# Patient Record
Sex: MALE | Race: WHITE | NOT HISPANIC OR LATINO | Employment: OTHER | URBAN - METROPOLITAN AREA
[De-identification: names, ages, dates, MRNs, and addresses within clinical notes are randomized per-mention and may not be internally consistent; named-entity substitution may affect disease eponyms.]

---

## 2017-01-26 ENCOUNTER — CLINICAL SUPPORT (OUTPATIENT)
Dept: CARDIOLOGY | Facility: CLINIC | Age: 67
End: 2017-01-26
Payer: COMMERCIAL

## 2017-01-26 ENCOUNTER — OFFICE VISIT (OUTPATIENT)
Dept: CARDIOLOGY | Facility: CLINIC | Age: 67
End: 2017-01-26
Payer: COMMERCIAL

## 2017-01-26 VITALS
BODY MASS INDEX: 27.52 KG/M2 | WEIGHT: 192.25 LBS | HEART RATE: 60 BPM | DIASTOLIC BLOOD PRESSURE: 70 MMHG | HEIGHT: 70 IN | SYSTOLIC BLOOD PRESSURE: 152 MMHG

## 2017-01-26 DIAGNOSIS — R93.1 ABNORMAL NUCLEAR CARDIAC IMAGING TEST: ICD-10-CM

## 2017-01-26 DIAGNOSIS — I25.10 CORONARY ARTERY DISEASE, ANGINA PRESENCE UNSPECIFIED, UNSPECIFIED VESSEL OR LESION TYPE, UNSPECIFIED WHETHER NATIVE OR TRANSPLANTED HEART: Primary | ICD-10-CM

## 2017-01-26 DIAGNOSIS — G47.30 SLEEP APNEA, UNSPECIFIED TYPE: ICD-10-CM

## 2017-01-26 DIAGNOSIS — Z82.49 FH: CAD (CORONARY ARTERY DISEASE): ICD-10-CM

## 2017-01-26 DIAGNOSIS — E78.5 HYPERLIPIDEMIA, UNSPECIFIED HYPERLIPIDEMIA TYPE: ICD-10-CM

## 2017-01-26 DIAGNOSIS — R94.30: ICD-10-CM

## 2017-01-26 DIAGNOSIS — I10 ESSENTIAL HYPERTENSION: ICD-10-CM

## 2017-01-26 DIAGNOSIS — I25.10 CORONARY ARTERY DISEASE, ANGINA PRESENCE UNSPECIFIED, UNSPECIFIED VESSEL OR LESION TYPE, UNSPECIFIED WHETHER NATIVE OR TRANSPLANTED HEART: ICD-10-CM

## 2017-01-26 PROCEDURE — 1157F ADVNC CARE PLAN IN RCRD: CPT | Mod: S$GLB,,, | Performed by: INTERNAL MEDICINE

## 2017-01-26 PROCEDURE — 99213 OFFICE O/P EST LOW 20 MIN: CPT | Mod: S$GLB,,, | Performed by: INTERNAL MEDICINE

## 2017-01-26 PROCEDURE — 1160F RVW MEDS BY RX/DR IN RCRD: CPT | Mod: S$GLB,,, | Performed by: INTERNAL MEDICINE

## 2017-01-26 PROCEDURE — 3078F DIAST BP <80 MM HG: CPT | Mod: S$GLB,,, | Performed by: INTERNAL MEDICINE

## 2017-01-26 PROCEDURE — 93000 ELECTROCARDIOGRAM COMPLETE: CPT | Mod: S$GLB,,, | Performed by: INTERNAL MEDICINE

## 2017-01-26 PROCEDURE — 3077F SYST BP >= 140 MM HG: CPT | Mod: S$GLB,,, | Performed by: INTERNAL MEDICINE

## 2017-01-26 PROCEDURE — 1159F MED LIST DOCD IN RCRD: CPT | Mod: S$GLB,,, | Performed by: INTERNAL MEDICINE

## 2017-01-26 PROCEDURE — 99999 PR PBB SHADOW E&M-EST. PATIENT-LVL III: CPT | Mod: PBBFAC,,, | Performed by: INTERNAL MEDICINE

## 2017-01-26 NOTE — PROGRESS NOTES
Subjective:   Patient ID:  Jose Luis Walker is a 66 y.o. male who presents for follow up of Coronary Artery Disease (annual f/u); Hypertension; and Hyperlipidemia      HPI  A 65 yo male with abnormal cardiolite non obs cad is  Here for f/u he lost weight compliant with diet uses cpap for sleep apnea  His blood pressure is well controlled today his bp is unusually  High.he ha sno chest pain or shortenss ofb breath very active physically ahs no syncope near syncope headache blurred vision chest pain or shortness of breath,.walks regularily for exercise.he is not taking any statins.   Past Medical History   Diagnosis Date    CAD (coronary artery disease) 6/26/2014     nonobs cad via cath    Ex-smoker     FH: CAD (coronary artery disease) 12/5/2013    Glaucoma     Hyperlipidemia 12/5/2013    Hypertension 12/5/2013    Lumbar disc disorder with myelopathy     Sleep apnea        Past Surgical History   Procedure Laterality Date    Tonsillectomy, adenoidectomy      Cardiac catheterization         Social History   Substance Use Topics    Smoking status: Former Smoker     Packs/day: 0.50     Years: 10.00     Quit date: 11/19/1983    Smokeless tobacco: Never Used      Comment: quit 30 yrs ago    Alcohol use Yes      Comment: Seldom       Family History   Problem Relation Age of Onset    Diabetes Father     Heart disease Father 58     MI     Heart attack Father     Hypertension Mother     Hyperlipidemia Mother     Heart disease Brother     Cancer Paternal Grandfather      Pancreatic Cancer    Glaucoma Paternal Uncle     Glaucoma Maternal Grandfather     Cataracts Paternal Grandmother     Strabismus Neg Hx     Retinal detachment Neg Hx     Macular degeneration Neg Hx     Blindness Neg Hx     Amblyopia Neg Hx     Thyroid disease Neg Hx     Stroke Neg Hx        Current Outpatient Prescriptions   Medication Sig    aspirin (ECOTRIN) 81 MG EC tablet Take 1 tablet (81 mg total) by mouth once daily.     GLUC/CHND/OM3/DHA/EPA/FISH/STR (GLUCOSAMINE CHONDROITIN PLUS ORAL) Take by mouth. Patient states that he takes this medication daily    latanoprost 0.005 % ophthalmic solution INSTILL 1 DROP IN BOTH EYES AT BEDTIME    metoprolol succinate (TOPROL-XL) 50 MG 24 hr tablet TAKE 1 TABLET (50 MG TOTAL) BY MOUTH ONCE DAILY.    ezetimibe (ZETIA) 10 mg tablet Take 1 tablet (10 mg total) by mouth every evening.    UNKNOWN TO PATIENT Medication for hair and nails     No current facility-administered medications for this visit.      Current Outpatient Prescriptions on File Prior to Visit   Medication Sig    aspirin (ECOTRIN) 81 MG EC tablet Take 1 tablet (81 mg total) by mouth once daily.    GLUC/CHND/OM3/DHA/EPA/FISH/STR (GLUCOSAMINE CHONDROITIN PLUS ORAL) Take by mouth. Patient states that he takes this medication daily    latanoprost 0.005 % ophthalmic solution INSTILL 1 DROP IN BOTH EYES AT BEDTIME    metoprolol succinate (TOPROL-XL) 50 MG 24 hr tablet TAKE 1 TABLET (50 MG TOTAL) BY MOUTH ONCE DAILY.    ezetimibe (ZETIA) 10 mg tablet Take 1 tablet (10 mg total) by mouth every evening.    UNKNOWN TO PATIENT Medication for hair and nails     No current facility-administered medications on file prior to visit.      Review of patient's allergies indicates:   Allergen Reactions    Codeine Other (See Comments)     Malaise and fatigue    Lipitor [atorvastatin]      aches    Pravastatin      Back aches      Simvastatin      Muscle aches       Review of Systems   Constitution: Negative for weakness and malaise/fatigue.   HENT: Negative for headaches.    Eyes: Negative for blurred vision.   Cardiovascular: Negative for chest pain, claudication, cyanosis, dyspnea on exertion, irregular heartbeat, leg swelling, near-syncope, orthopnea, palpitations and paroxysmal nocturnal dyspnea.   Respiratory: Negative for cough, hemoptysis and shortness of breath.    Hematologic/Lymphatic: Negative for bleeding problem. Does not  "bruise/bleed easily.   Skin: Negative for dry skin and itching.   Musculoskeletal: Negative for falls, muscle weakness and myalgias.   Gastrointestinal: Negative for abdominal pain, diarrhea, heartburn, hematemesis, hematochezia and melena.   Genitourinary: Negative for flank pain and hematuria.   Neurological: Negative for dizziness, focal weakness, light-headedness, numbness, paresthesias and seizures.   Psychiatric/Behavioral: Negative for altered mental status and memory loss. The patient is not nervous/anxious.    Allergic/Immunologic: Negative for hives.       Objective:   Physical Exam   Constitutional: He is oriented to person, place, and time. He appears well-developed and well-nourished. No distress.   HENT:   Head: Normocephalic and atraumatic.   Eyes: EOM are normal. Pupils are equal, round, and reactive to light. Right eye exhibits no discharge. Left eye exhibits no discharge.   Neck: Neck supple. No JVD present. No thyromegaly present.   Cardiovascular: Normal rate, regular rhythm, normal heart sounds and intact distal pulses.  Exam reveals no gallop and no friction rub.    No murmur heard.  Pulmonary/Chest: Effort normal and breath sounds normal. No respiratory distress. He has no wheezes. He has no rales. He exhibits no tenderness.   Abdominal: Soft. Bowel sounds are normal. He exhibits no distension. There is no tenderness.   Musculoskeletal: Normal range of motion. He exhibits no edema.   Neurological: He is alert and oriented to person, place, and time. No cranial nerve deficit.   Skin: Skin is warm and dry. No rash noted. He is not diaphoretic. No erythema.   Psychiatric: He has a normal mood and affect. His behavior is normal.   Nursing note and vitals reviewed.    Vitals:    01/26/17 1439   BP: (!) 152/70   Pulse: 60   Weight: 87.2 kg (192 lb 3.9 oz)   Height: 5' 10" (1.778 m)     Lab Results   Component Value Date    CHOL 216 (H) 07/14/2016    CHOL 201 (H) 01/05/2016    CHOL 205 (H) 12/19/2014 "     Lab Results   Component Value Date    HDL 56 07/14/2016    HDL 49 01/05/2016    HDL 51 12/19/2014     Lab Results   Component Value Date    LDLCALC 149.4 07/14/2016    LDLCALC 134.4 01/05/2016    LDLCALC 141.4 12/19/2014     Lab Results   Component Value Date    TRIG 53 07/14/2016    TRIG 88 01/05/2016    TRIG 63 12/19/2014     Lab Results   Component Value Date    CHOLHDL 25.9 07/14/2016    CHOLHDL 24.4 01/05/2016    CHOLHDL 24.9 12/19/2014       Chemistry        Component Value Date/Time     07/14/2016 0758    K 4.3 07/14/2016 0758     07/14/2016 0758    CO2 28 07/14/2016 0758    BUN 16 07/14/2016 0758    CREATININE 1.0 07/14/2016 0758     (H) 07/14/2016 0758        Component Value Date/Time    CALCIUM 9.4 07/14/2016 0758    ALKPHOS 62 07/14/2016 0758    AST 18 07/14/2016 0758    ALT 19 07/14/2016 0758    BILITOT 0.5 07/14/2016 0758          Lab Results   Component Value Date    TSH 1.085 11/19/2013     No results found for: INR, PROTIME  Lab Results   Component Value Date    WBC 6.09 03/16/2015    HGB 14.1 03/16/2015    HCT 41.5 03/16/2015    MCV 94 03/16/2015     03/16/2015     BMP  Sodium   Date Value Ref Range Status   07/14/2016 142 136 - 145 mmol/L Final     Potassium   Date Value Ref Range Status   07/14/2016 4.3 3.5 - 5.1 mmol/L Final     Chloride   Date Value Ref Range Status   07/14/2016 106 95 - 110 mmol/L Final     CO2   Date Value Ref Range Status   07/14/2016 28 23 - 29 mmol/L Final     BUN, Bld   Date Value Ref Range Status   07/14/2016 16 8 - 23 mg/dL Final     Creatinine   Date Value Ref Range Status   07/14/2016 1.0 0.5 - 1.4 mg/dL Final     Calcium   Date Value Ref Range Status   07/14/2016 9.4 8.7 - 10.5 mg/dL Final     Anion Gap   Date Value Ref Range Status   07/14/2016 8 8 - 16 mmol/L Final     eGFR if    Date Value Ref Range Status   07/14/2016 >60.0 >60 mL/min/1.73 m^2 Final     eGFR if non    Date Value Ref Range Status    07/14/2016 >60.0 >60 mL/min/1.73 m^2 Final     Comment:     Calculation used to obtain the estimated glomerular filtration  rate (eGFR) is the CKD-EPI equation. Since race is unknown   in our information system, the eGFR values for   -American and Non--American patients are given   for each creatinine result.       CrCl cannot be calculated (Patient has no serum creatinine result on file.).    Assessment:     1. Coronary artery disease, angina presence unspecified, unspecified vessel or lesion type, unspecified whether native or transplanted heart    2. Nonspecific abnormal unspecified cardiovascular function study    3. Abnormal nuclear cardiac imaging test    4. Essential hypertension    5. Hyperlipidemia, unspecified hyperlipidemia type    6. FH: CAD (coronary artery disease)    7. Sleep apnea, unspecified type      Asymptomatic clinically counseled about compliance.  Plan:   Lipid cmp   Risk factor modification   Diet exercise   Continue weight loss  Low salt   check on need for statins again.

## 2017-01-26 NOTE — MR AVS SNAPSHOT
[facility]  [address] [facility] [address]  Phone: [phone]  Fax: [phone]                  Jose Luis Walker   2017 2:15 PM   Office Visit    Description:  Male : 1950   Provider:  Patrick Shin MD   Department:  [facility]           Reason for Visit     Coronary Artery Disease     Hypertension     Hyperlipidemia           Diagnoses this Visit        Comments    Coronary artery disease, angina presence unspecified, unspecified vessel or lesion type, unspecified whether native or transplanted heart    -  Primary     Nonspecific abnormal unspecified cardiovascular function study         Abnormal nuclear cardiac imaging test         Essential hypertension         Hyperlipidemia, unspecified hyperlipidemia type         FH: CAD (coronary artery disease)         Sleep apnea, unspecified type                To Do List           Future Appointments        Provider Department Dept Phone    2017 7:45 AM Joseph Garcia MD [facility] Ophthalmology [phone]      Goals (5 Years of Data)     None      Follow-Up and Disposition     Return in about 6 months (around 2017).      [facility]     [facility] Nurse Care Line -  Assistance  Registered nurses in the [facility] Center provide clinical advisement, health education, appointment booking, and other advisory services.  Call for this free service at [phone].             Medications           Message regarding Medications     Verify the changes and/or additions to your medication regime listed below are the same as discussed with your clinician today.  If any of these changes or additions are incorrect, please notify your healthcare provider.             Verify that the below list of medications is an accurate representation of the medications you are currently taking.  If none reported, the list may be blank. If incorrect, please contact your healthcare provider. Carry this  "list with you in case of emergency.           Current Medications     aspirin (ECOTRIN) 81 MG EC tablet Take 1 tablet (81 mg total) by mouth once daily.    GLUC/CHND/OM3/DHA/EPA/FISH/STR (GLUCOSAMINE CHONDROITIN PLUS ORAL) Take by mouth. Patient states that he takes this medication daily    latanoprost 0.005 % ophthalmic solution INSTILL 1 DROP IN BOTH EYES AT BEDTIME    metoprolol succinate (TOPROL-XL) 50 MG 24 hr tablet TAKE 1 TABLET (50 MG TOTAL) BY MOUTH ONCE DAILY.    ezetimibe (ZETIA) 10 mg tablet Take 1 tablet (10 mg total) by mouth every evening.    UNKNOWN TO PATIENT Medication for hair and nails           Clinical Reference Information           Vital Signs - Last Recorded  Most recent update: 1/26/2017  2:47 PM by Dianne Chaudhary LPN    BP Pulse Ht Wt BMI    (!) 152/70 60 5' 10" (1.778 m) 87.2 kg (192 lb 3.9 oz) 27.58 kg/m2      Blood Pressure          Most Recent Value    Right Arm BP - Sitting  152/70    Left Arm BP - Sitting  152/66    BP  (!)  152/70      Allergies as of 1/26/2017     Codeine    Lipitor [Atorvastatin]    Pravastatin    Simvastatin      Immunizations Administered on Date of Encounter - 1/26/2017     None      Orders Placed During Today's Visit     Future Labs/Procedures Expected by Expires    Comprehensive metabolic panel  1/26/2017 (Approximate) 1/26/2018    Lipid panel  1/26/2017 (Approximate) 1/26/2018    SCHEDULED EKG 12-LEAD (to Muse)  As directed 1/25/2018      Smoking Cessation     If you would like to quit smoking:   You may be eligible for free services if you are a [place] resident and started smoking cigarettes before September 1, 1988.  Call the [employer]oking [employer]essation [employer] (S[employer]) toll free at (878) 979-7460 or (942) 621-0917.   Call 0-800-QUIT-NOW if you do not meet the above criteria.            "

## 2017-01-27 ENCOUNTER — OFFICE VISIT (OUTPATIENT)
Dept: OPHTHALMOLOGY | Facility: CLINIC | Age: 67
End: 2017-01-27
Payer: COMMERCIAL

## 2017-01-27 DIAGNOSIS — H25.13 NUCLEAR SCLEROSIS, BILATERAL: ICD-10-CM

## 2017-01-27 DIAGNOSIS — H40.1131 PRIMARY OPEN ANGLE GLAUCOMA OF BOTH EYES, MILD STAGE: Primary | ICD-10-CM

## 2017-01-27 PROCEDURE — 92012 INTRM OPH EXAM EST PATIENT: CPT | Mod: S$GLB,,, | Performed by: OPHTHALMOLOGY

## 2017-01-27 PROCEDURE — 99999 PR PBB SHADOW E&M-EST. PATIENT-LVL II: CPT | Mod: PBBFAC,,, | Performed by: OPHTHALMOLOGY

## 2017-01-27 NOTE — PROGRESS NOTES
SUBJECTIVE:   Jose uLis Walker is a 66 y.o. male   Uncorrected distance visual acuity was 20/25 -2 in the right eye and 20/25 -2 in the left eye.   Chief Complaint   Patient presents with    Glaucoma        HPI:  HPI     Pt states that there has been no change since last appt. 100% compliant   with gtts.     1. Mild COAG pre SLT 27/28 (goal = 21)  (? Steroid responder)  SLT OD 11/09 (27-17)  SLT OS 9/09 (28-18)  2. Trace NSC  3. ?DM (patient doubts diagnosis)    Latanoprost QHS OU       Last edited by Aniket Baer, Patient Care Assistant on 1/27/2017  7:45   AM.     Assessment /Plan :  1. Primary open angle glaucoma of both eyes, mild stage   Doing well, IOP OD within acceptable range relative to target IOP and no evidence of progression. Continue current treatment. Reviewed importance of continued compliance with treatment and follow up.    IOP OS not within acceptable range relative to target IOP with risk of irreversible visual loss. Better IOP control is recommended. Discussed options, risks, and benefits of additional medication, SLT laser, and/or incisional glaucoma surgery. Reviewed importance of continued compliance with treatment and follow up.     Patient chooses defer and recheck IOP next visit  Steroid responder     2. Nuclear sclerosis, bilateral monitor for now       Return to clinic in 3 months  or as needed.  With 24-2 HVF, Dilation and SDP's

## 2017-02-01 ENCOUNTER — LAB VISIT (OUTPATIENT)
Dept: LAB | Facility: HOSPITAL | Age: 67
End: 2017-02-01
Attending: INTERNAL MEDICINE
Payer: COMMERCIAL

## 2017-02-01 DIAGNOSIS — I25.10 CORONARY ARTERY DISEASE, ANGINA PRESENCE UNSPECIFIED, UNSPECIFIED VESSEL OR LESION TYPE, UNSPECIFIED WHETHER NATIVE OR TRANSPLANTED HEART: ICD-10-CM

## 2017-02-01 LAB
ALBUMIN SERPL BCP-MCNC: 3.5 G/DL
ALP SERPL-CCNC: 48 U/L
ALT SERPL W/O P-5'-P-CCNC: 16 U/L
ANION GAP SERPL CALC-SCNC: 7 MMOL/L
AST SERPL-CCNC: 18 U/L
BILIRUB SERPL-MCNC: 0.3 MG/DL
BUN SERPL-MCNC: 14 MG/DL
CALCIUM SERPL-MCNC: 9 MG/DL
CHLORIDE SERPL-SCNC: 109 MMOL/L
CHOLEST/HDLC SERPL: 3.5 {RATIO}
CO2 SERPL-SCNC: 27 MMOL/L
CREAT SERPL-MCNC: 0.9 MG/DL
EST. GFR  (AFRICAN AMERICAN): >60 ML/MIN/1.73 M^2
EST. GFR  (NON AFRICAN AMERICAN): >60 ML/MIN/1.73 M^2
GLUCOSE SERPL-MCNC: 110 MG/DL
HDL/CHOLESTEROL RATIO: 28.6 %
HDLC SERPL-MCNC: 196 MG/DL
HDLC SERPL-MCNC: 56 MG/DL
LDLC SERPL CALC-MCNC: 129.2 MG/DL
NONHDLC SERPL-MCNC: 140 MG/DL
POTASSIUM SERPL-SCNC: 4.3 MMOL/L
PROT SERPL-MCNC: 6.8 G/DL
SODIUM SERPL-SCNC: 143 MMOL/L
TRIGL SERPL-MCNC: 54 MG/DL

## 2017-02-01 PROCEDURE — 36415 COLL VENOUS BLD VENIPUNCTURE: CPT

## 2017-02-01 PROCEDURE — 80053 COMPREHEN METABOLIC PANEL: CPT

## 2017-02-01 PROCEDURE — 80061 LIPID PANEL: CPT

## 2017-02-02 ENCOUNTER — TELEPHONE (OUTPATIENT)
Dept: CARDIOLOGY | Facility: CLINIC | Age: 67
End: 2017-02-02

## 2017-02-02 NOTE — TELEPHONE ENCOUNTER
----- Message from Patrick Shin MD sent at 2/1/2017 11:22 PM CST -----  His lipids are not well controlled although improved from before will suggest crestor 10 mg po daily

## 2017-02-02 NOTE — TELEPHONE ENCOUNTER
Attempted without success to reach patient regarding lab results.  Message left on voicemail of mobile phone.  No answer on home phone.

## 2017-03-01 DIAGNOSIS — E78.5 OTHER AND UNSPECIFIED HYPERLIPIDEMIA: Primary | ICD-10-CM

## 2017-03-01 RX ORDER — ROSUVASTATIN CALCIUM 10 MG/1
10 TABLET, COATED ORAL NIGHTLY
Qty: 90 TABLET | Refills: 3 | Status: SHIPPED | OUTPATIENT
Start: 2017-03-01 | End: 2018-02-24 | Stop reason: SDUPTHER

## 2017-03-01 NOTE — TELEPHONE ENCOUNTER
The patient has been notified of this information and all questions answered. Crestor 10mg qhs routed to Dr. Shin for approval.

## 2017-03-09 ENCOUNTER — PATIENT MESSAGE (OUTPATIENT)
Dept: RESEARCH | Facility: HOSPITAL | Age: 67
End: 2017-03-09

## 2017-03-28 RX ORDER — METOPROLOL SUCCINATE 50 MG/1
TABLET, EXTENDED RELEASE ORAL
Qty: 90 TABLET | Refills: 1 | Status: SHIPPED | OUTPATIENT
Start: 2017-03-28 | End: 2017-09-26 | Stop reason: SDUPTHER

## 2017-03-28 RX ORDER — LATANOPROST 50 UG/ML
SOLUTION/ DROPS OPHTHALMIC
Qty: 2.5 ML | Refills: 12 | Status: SHIPPED | OUTPATIENT
Start: 2017-03-28 | End: 2018-03-29 | Stop reason: SDUPTHER

## 2017-04-07 ENCOUNTER — OFFICE VISIT (OUTPATIENT)
Dept: OPHTHALMOLOGY | Facility: CLINIC | Age: 67
End: 2017-04-07
Payer: COMMERCIAL

## 2017-04-07 DIAGNOSIS — H40.1131 PRIMARY OPEN ANGLE GLAUCOMA OF BOTH EYES, MILD STAGE: Primary | ICD-10-CM

## 2017-04-07 DIAGNOSIS — H25.13 NUCLEAR SCLEROSIS, BILATERAL: ICD-10-CM

## 2017-04-07 PROCEDURE — 92083 EXTENDED VISUAL FIELD XM: CPT | Mod: S$GLB,,, | Performed by: OPHTHALMOLOGY

## 2017-04-07 PROCEDURE — 99999 PR PBB SHADOW E&M-EST. PATIENT-LVL II: CPT | Mod: PBBFAC,,, | Performed by: OPHTHALMOLOGY

## 2017-04-07 PROCEDURE — 92014 COMPRE OPH EXAM EST PT 1/>: CPT | Mod: S$GLB,,, | Performed by: OPHTHALMOLOGY

## 2017-04-07 PROCEDURE — 92250 FUNDUS PHOTOGRAPHY W/I&R: CPT | Mod: S$GLB,,, | Performed by: OPHTHALMOLOGY

## 2017-04-07 NOTE — PROGRESS NOTES
SUBJECTIVE:   Jose Luis Walker is a 66 y.o. male   Uncorrected distance visual acuity was 20/30 in the right eye and 20/30 in the left eye.   Chief Complaint   Patient presents with    Glaucoma     HVF SDP chk        HPI:  HPI     Glaucoma    Additional comments: HVF SDP chk           Comments   Pt states no change since last exam. No pain or discomfort. 1005 compliant   with gtts.     1. Mild COAG pre SLT 27/28 (goal = 21)  ( Steroid responder)  SLT OD 11/09 (27-17)  SLT OS 9/09 (28-18)  2. Trace NSC  3. Fhx DM    Latanoprost QHS OU       Last edited by Aniket Baer, Patient Care Assistant on 4/7/2017  7:58   AM. (History)        Assessment /Plan :  1. Primary open angle glaucoma of both eyes, mild stage Doing well, IOP within acceptable range relative to target IOP and no evidence of progression. Continue current treatment. Reviewed importance of continued compliance with treatment and follow up.   2. Nuclear sclerosis, bilateral  - not visually significant. Observe.     Return to clinic in 4 months  or as needed.  With IOP Check and GOCT

## 2017-04-07 NOTE — MR AVS SNAPSHOT
[facility] - Ophthalmology  [address] [facility] [place] [address]  Phone: [phone]  Fax: [phone]                  Jose Luis Walker   2017 8:00 AM   Office Visit    Description:  Male : 1950   Provider:  Joseph Garcia MD   Department:  [facility] - Ophthalmology           Reason for Visit     Glaucoma           Diagnoses this Visit        Comments    Primary open angle glaucoma of both eyes, mild stage    -  Primary     Nuclear sclerosis, bilateral                To Do List           Goals (5 Years of Data)     None      Follow-Up and Disposition     Return in about 4 months (around 2017).      [facility]     [facility] On [facility] Nurse Care Line -  Assistance  Unless otherwise directed by your provider, please contact [facility], our nurse care line that is available for  assistance.     Registered nurses in the [facility] Center provide: appointment scheduling, clinical advisement, health education, and other advisory services.  Call: [phone] (toll free)               Medications           Message regarding Medications     Verify the changes and/or additions to your medication regime listed below are the same as discussed with your clinician today.  If any of these changes or additions are incorrect, please notify your healthcare provider.             Verify that the below list of medications is an accurate representation of the medications you are currently taking.  If none reported, the list may be blank. If incorrect, please contact your healthcare provider. Carry this list with you in case of emergency.           Current Medications     aspirin (ECOTRIN) 81 MG EC tablet Take 1 tablet (81 mg total) by mouth once daily.    ezetimibe (ZETIA) 10 mg tablet Take 1 tablet (10 mg total) by mouth every evening.    GLUC/CHND/OM3/DHA/EPA/FISH/STR (GLUCOSAMINE CHONDROITIN PLUS ORAL) Take by mouth. Patient states that he takes this medication daily    latanoprost 0.005 %  ophthalmic solution INSTILL 1 DROP INTO BOTH EYES AT BEDTIME    metoprolol succinate (TOPROL-XL) 50 MG 24 hr tablet TAKE 1 TABLET (50 MG TOTAL) BY MOUTH ONCE DAILY.    rosuvastatin (CRESTOR) 10 MG tablet Take 1 tablet (10 mg total) by mouth every evening.    UNKNOWN TO PATIENT Medication for hair and nails           Clinical Reference Information           Allergies as of 4/7/2017     Codeine    Lipitor [Atorvastatin]    Pravastatin    Simvastatin      Immunizations Administered on Date of Encounter - 4/7/2017     None      Orders Placed During Today's Visit      Normal Orders This Visit    Color Fundus Photography - OU - Both Eyes     Mata Visual Field - OU - Extended - Both Eyes       Language Assistance Services     ATTENTION: Language assistance services are available, free of charge. Please call [phone].      ATENCIÓN: Si tylerla petra, tiene a vásquez disposición servicios gratuitos de asistencia lingüística. Llame al [phone].     CHÚ Ý: N?u b?n nói Ti?ng Vi?t, có các d?ch v? h? tr? ngôn ng? mi?n phí dành cho b?n. G?i s? [phone].         [facility] complies with applicable Federal civil rights laws and does not discriminate on the basis of race, color, national origin, age, disability, or sex.

## 2017-08-25 ENCOUNTER — OFFICE VISIT (OUTPATIENT)
Dept: OPHTHALMOLOGY | Facility: CLINIC | Age: 67
End: 2017-08-25
Payer: COMMERCIAL

## 2017-08-25 DIAGNOSIS — H40.1131 PRIMARY OPEN ANGLE GLAUCOMA OF BOTH EYES, MILD STAGE: Primary | ICD-10-CM

## 2017-08-25 DIAGNOSIS — H25.13 NUCLEAR SCLEROSIS OF BOTH EYES: ICD-10-CM

## 2017-08-25 PROCEDURE — 92012 INTRM OPH EXAM EST PATIENT: CPT | Mod: S$GLB,,, | Performed by: OPHTHALMOLOGY

## 2017-08-25 PROCEDURE — 99999 PR PBB SHADOW E&M-EST. PATIENT-LVL II: CPT | Mod: PBBFAC,,, | Performed by: OPHTHALMOLOGY

## 2017-08-25 PROCEDURE — 92133 CPTRZD OPH DX IMG PST SGM ON: CPT | Mod: S$GLB,,, | Performed by: OPHTHALMOLOGY

## 2017-08-25 RX ORDER — KETOROLAC TROMETHAMINE 5 MG/ML
SOLUTION OPHTHALMIC
Qty: 1 BOTTLE | Refills: 0 | Status: SHIPPED | OUTPATIENT
Start: 2017-08-25 | End: 2017-10-06 | Stop reason: ALTCHOICE

## 2017-08-25 NOTE — PROGRESS NOTES
SUBJECTIVE:   Jose Luis Walker is a 67 y.o. male   Uncorrected distance visual acuity was 20/40 -2 in the right eye and 20/30 -2 in the left eye.   Chief Complaint   Patient presents with    Glaucoma     4 mth iop ck and goct.         HPI:  HPI     Glaucoma    Additional comments: 4 mth iop ck and goct.            Comments   1. Mild COAG pre SLT 27/28 (goal = 21)  ( Steroid responder)  SLT OD 11/09 (27-17)  SLT OS 9/09 (28-18)  2. Trace NSC  3. Fhx DM    Latanoprost QHS OU       Last edited by Arabella Wilkerson MA on 8/25/2017  8:21 AM. (History)        Assessment /Plan :  1. Primary open angle glaucoma of both eyes, mild stage IOP not within acceptable range relative to target IOP with risk of irreversible visual loss. Better IOP control is recommended. Discussed options, risks, and benefits of additional medication, SLT laser, and/or incisional glaucoma surgery. Reviewed importance of continued compliance with treatment and follow up.     Patient chooses schedule SLT  OU     2. Nuclear sclerosis of both eyes monitor for now     Return for the SLT OU

## 2017-09-13 ENCOUNTER — PATIENT OUTREACH (OUTPATIENT)
Dept: ADMINISTRATIVE | Facility: HOSPITAL | Age: 67
End: 2017-09-13

## 2017-09-13 NOTE — PROGRESS NOTES
I have attempted without success to contact this patient to schedule an appointment for blood pressure recheck. Patient also due for Abdominal Aortic Aneurysm Screening, PSA, influenza, and pneumococcal vaccinations.   Patient not available, left voicemail.

## 2017-09-28 RX ORDER — METOPROLOL SUCCINATE 50 MG/1
TABLET, EXTENDED RELEASE ORAL
Qty: 90 TABLET | Refills: 1 | Status: SHIPPED | OUTPATIENT
Start: 2017-09-28 | End: 2018-03-24 | Stop reason: SDUPTHER

## 2017-10-04 ENCOUNTER — OFFICE VISIT (OUTPATIENT)
Dept: CARDIOLOGY | Facility: CLINIC | Age: 67
End: 2017-10-04
Payer: COMMERCIAL

## 2017-10-04 VITALS
HEART RATE: 64 BPM | SYSTOLIC BLOOD PRESSURE: 130 MMHG | DIASTOLIC BLOOD PRESSURE: 66 MMHG | WEIGHT: 195.75 LBS | HEIGHT: 70 IN | BODY MASS INDEX: 28.02 KG/M2

## 2017-10-04 DIAGNOSIS — Z82.49 FH: CAD (CORONARY ARTERY DISEASE): ICD-10-CM

## 2017-10-04 DIAGNOSIS — G47.30 SLEEP APNEA, UNSPECIFIED TYPE: ICD-10-CM

## 2017-10-04 DIAGNOSIS — I10 ESSENTIAL HYPERTENSION: ICD-10-CM

## 2017-10-04 DIAGNOSIS — R73.03 PREDIABETES: ICD-10-CM

## 2017-10-04 DIAGNOSIS — E78.5 HYPERLIPIDEMIA, UNSPECIFIED HYPERLIPIDEMIA TYPE: ICD-10-CM

## 2017-10-04 DIAGNOSIS — I25.10 CORONARY ARTERY DISEASE INVOLVING NATIVE CORONARY ARTERY OF NATIVE HEART WITHOUT ANGINA PECTORIS: Primary | ICD-10-CM

## 2017-10-04 DIAGNOSIS — R94.39 ABNORMAL STRESS TEST: ICD-10-CM

## 2017-10-04 PROCEDURE — 99213 OFFICE O/P EST LOW 20 MIN: CPT | Mod: S$GLB,,, | Performed by: INTERNAL MEDICINE

## 2017-10-04 PROCEDURE — 99999 PR PBB SHADOW E&M-EST. PATIENT-LVL III: CPT | Mod: PBBFAC,,, | Performed by: INTERNAL MEDICINE

## 2017-10-05 DIAGNOSIS — Z00.00 ROUTINE GENERAL MEDICAL EXAMINATION AT A HEALTH CARE FACILITY: Primary | ICD-10-CM

## 2017-10-06 ENCOUNTER — CLINICAL SUPPORT (OUTPATIENT)
Dept: INTERNAL MEDICINE | Facility: CLINIC | Age: 67
End: 2017-10-06
Payer: COMMERCIAL

## 2017-10-06 ENCOUNTER — OFFICE VISIT (OUTPATIENT)
Dept: INTERNAL MEDICINE | Facility: CLINIC | Age: 67
End: 2017-10-06
Payer: COMMERCIAL

## 2017-10-06 ENCOUNTER — HOSPITAL ENCOUNTER (OUTPATIENT)
Dept: RADIOLOGY | Facility: HOSPITAL | Age: 67
Discharge: HOME | End: 2017-10-06
Attending: FAMILY MEDICINE
Payer: COMMERCIAL

## 2017-10-06 VITALS
BODY MASS INDEX: 27.75 KG/M2 | WEIGHT: 193.81 LBS | HEART RATE: 72 BPM | WEIGHT: 193.13 LBS | HEART RATE: 72 BPM | DIASTOLIC BLOOD PRESSURE: 64 MMHG | BODY MASS INDEX: 27.65 KG/M2 | SYSTOLIC BLOOD PRESSURE: 132 MMHG | RESPIRATION RATE: 14 BRPM | SYSTOLIC BLOOD PRESSURE: 132 MMHG | DIASTOLIC BLOOD PRESSURE: 64 MMHG | HEIGHT: 70 IN | TEMPERATURE: 96 F | HEIGHT: 70 IN

## 2017-10-06 DIAGNOSIS — Z82.49 FH: CAD (CORONARY ARTERY DISEASE): ICD-10-CM

## 2017-10-06 DIAGNOSIS — Z00.00 ROUTINE GENERAL MEDICAL EXAMINATION AT A HEALTH CARE FACILITY: Primary | ICD-10-CM

## 2017-10-06 DIAGNOSIS — Z00.00 ANNUAL PHYSICAL EXAM: Primary | ICD-10-CM

## 2017-10-06 DIAGNOSIS — Z00.00 ROUTINE GENERAL MEDICAL EXAMINATION AT A HEALTH CARE FACILITY: ICD-10-CM

## 2017-10-06 DIAGNOSIS — E78.5 HYPERLIPIDEMIA, UNSPECIFIED HYPERLIPIDEMIA TYPE: ICD-10-CM

## 2017-10-06 LAB
ALBUMIN SERPL BCP-MCNC: 3.7 G/DL
ALP SERPL-CCNC: 52 U/L
ALT SERPL W/O P-5'-P-CCNC: 22 U/L
ANION GAP SERPL CALC-SCNC: 10 MMOL/L
AST SERPL-CCNC: 21 U/L
BILIRUB SERPL-MCNC: 0.5 MG/DL
BUN SERPL-MCNC: 19 MG/DL
CALCIUM SERPL-MCNC: 9.9 MG/DL
CHLORIDE SERPL-SCNC: 107 MMOL/L
CHOLEST SERPL-MCNC: 135 MG/DL
CHOLEST/HDLC SERPL: 2.5 {RATIO}
CO2 SERPL-SCNC: 24 MMOL/L
COMPLEXED PSA SERPL-MCNC: 1.4 NG/ML
CREAT SERPL-MCNC: 0.9 MG/DL
ERYTHROCYTE [DISTWIDTH] IN BLOOD BY AUTOMATED COUNT: 13.5 %
EST. GFR  (AFRICAN AMERICAN): >60 ML/MIN/1.73 M^2
EST. GFR  (NON AFRICAN AMERICAN): >60 ML/MIN/1.73 M^2
ESTIMATED AVG GLUCOSE: 114 MG/DL
GLUCOSE SERPL-MCNC: 111 MG/DL
HBA1C MFR BLD HPLC: 5.6 %
HCT VFR BLD AUTO: 41.8 %
HDLC SERPL-MCNC: 55 MG/DL
HDLC SERPL: 40.7 %
HGB BLD-MCNC: 14.4 G/DL
LDLC SERPL CALC-MCNC: 67 MG/DL
MCH RBC QN AUTO: 31.4 PG
MCHC RBC AUTO-ENTMCNC: 34.4 G/DL
MCV RBC AUTO: 91 FL
NONHDLC SERPL-MCNC: 80 MG/DL
PLATELET # BLD AUTO: 203 K/UL
PMV BLD AUTO: 10.1 FL
POTASSIUM SERPL-SCNC: 3.7 MMOL/L
PROT SERPL-MCNC: 7.3 G/DL
RBC # BLD AUTO: 4.59 M/UL
SODIUM SERPL-SCNC: 141 MMOL/L
TRIGL SERPL-MCNC: 65 MG/DL
TSH SERPL DL<=0.005 MIU/L-ACNC: 1.17 UIU/ML
WBC # BLD AUTO: 5.87 K/UL

## 2017-10-06 PROCEDURE — 90471 IMMUNIZATION ADMIN: CPT | Mod: ,,, | Performed by: FAMILY MEDICINE

## 2017-10-06 PROCEDURE — 80061 LIPID PANEL: CPT | Mod: PO

## 2017-10-06 PROCEDURE — 99397 PER PM REEVAL EST PAT 65+ YR: CPT | Mod: 25,,, | Performed by: FAMILY MEDICINE

## 2017-10-06 PROCEDURE — 83036 HEMOGLOBIN GLYCOSYLATED A1C: CPT

## 2017-10-06 PROCEDURE — 84443 ASSAY THYROID STIM HORMONE: CPT

## 2017-10-06 PROCEDURE — 75571 CT HRT W/O DYE W/CA TEST: CPT | Mod: 26,,, | Performed by: RADIOLOGY

## 2017-10-06 PROCEDURE — 99999 PR PBB SHADOW E&M-EST. PATIENT-LVL III: CPT | Mod: PBBFAC,,, | Performed by: FAMILY MEDICINE

## 2017-10-06 PROCEDURE — 90670 PCV13 VACCINE IM: CPT | Mod: ,,, | Performed by: FAMILY MEDICINE

## 2017-10-06 PROCEDURE — 75571 CT HRT W/O DYE W/CA TEST: CPT | Mod: TC,PO

## 2017-10-06 PROCEDURE — 80053 COMPREHEN METABOLIC PANEL: CPT | Mod: PO

## 2017-10-06 PROCEDURE — 85027 COMPLETE CBC AUTOMATED: CPT | Mod: PO

## 2017-10-06 PROCEDURE — 84153 ASSAY OF PSA TOTAL: CPT

## 2017-10-06 NOTE — PROGRESS NOTES
Subjective:       Patient ID: Jose Luis Walker is a 67 y.o. male.    Chief Complaint: Annual Exam    Annual exam:      Pt is a 67 year old who is here for executive health. Pt has had multiple cardiac tests that were abnormal but Cath was negative. Pt is up-to-date on his wellness.       Review of Systems   Constitutional: Negative.    Respiratory: Negative.    Cardiovascular: Negative.    Genitourinary: Negative.    Musculoskeletal: Negative.    Neurological: Negative.    Hematological: Negative.    Psychiatric/Behavioral: Negative.        Objective:      Physical Exam   Constitutional: He is oriented to person, place, and time. He appears well-developed and well-nourished.   Neck: Normal range of motion. Neck supple.   Cardiovascular: Normal rate and regular rhythm.    No murmur heard.  Pulmonary/Chest: Effort normal and breath sounds normal. He has no wheezes. He has no rales.   Abdominal: Soft. Bowel sounds are normal. He exhibits no mass. There is no guarding.   Neurological: He is alert and oriented to person, place, and time.   Skin: Skin is warm and dry.   Psychiatric: He has a normal mood and affect. His behavior is normal.       Assessment:       1. Annual physical exam    2. Hyperlipidemia, unspecified hyperlipidemia type    3. FH: CAD (coronary artery disease)        Plan:       Annual physical exam  Comments:  Pt is over all good health  Orders:  -     (In Office Administered) Pneumococcal Conjugate Vaccine (13 Valent) (IM)    Hyperlipidemia, unspecified hyperlipidemia type  Comments:  Pt continues with crestor.     FH: CAD (coronary artery disease)  Comments:  Pt has had negative Angio

## 2017-11-03 ENCOUNTER — OFFICE VISIT (OUTPATIENT)
Dept: OPHTHALMOLOGY | Facility: CLINIC | Age: 67
End: 2017-11-03
Payer: COMMERCIAL

## 2017-11-03 DIAGNOSIS — H25.13 NUCLEAR SCLEROSIS OF BOTH EYES: ICD-10-CM

## 2017-11-03 DIAGNOSIS — H40.1131 PRIMARY OPEN ANGLE GLAUCOMA OF BOTH EYES, MILD STAGE: Primary | ICD-10-CM

## 2017-11-03 PROCEDURE — 92012 INTRM OPH EXAM EST PATIENT: CPT | Mod: S$GLB,,, | Performed by: OPHTHALMOLOGY

## 2017-11-03 PROCEDURE — 99999 PR PBB SHADOW E&M-EST. PATIENT-LVL II: CPT | Mod: PBBFAC,,, | Performed by: OPHTHALMOLOGY

## 2017-11-03 NOTE — PROGRESS NOTES
SUBJECTIVE:   Jose Luis Walker is a 67 y.o. male   Uncorrected distance visual acuity was 20/25 -1 in the right eye and 20/25 -2 in the left eye.   Chief Complaint   Patient presents with    Glaucoma     Latanoprost qhs % cx        HPI:  HPI     Glaucoma    Additional comments: Latanoprost qhs % cx           Comments   5 to 6 weeks post SLT OU 9/27/17    No new eye changes since patient's last visit    1. Mild COAG pre SLT 27/28 (goal = 21)  ( Steroid responder)  SLT OD 11/09 (27-17) + 9/27/17 (29-)  SLT OS 9/09 (28-18) + 9/27/17 (30-)  2. Trace NSC  3. Fhx DM    Latanoprost QHS OU  100% cx       Last edited by Alison Mariscal on 11/3/2017  8:08 AM. (History)        Assessment /Plan :  1. Primary open angle glaucoma of both eyes, mild stage   nice response to the SLT OU  Doing well, IOP within acceptable range relative to target IOP and no evidence of progression. Continue current treatment. Reviewed importance of continued compliance with treatment and follow up.     2. Nuclear sclerosis of both eyes  - not visually significant. Observe.         Return to clinic in 4 months  or as needed.  With IOP Check

## 2018-01-08 PROBLEM — Z00.00 ANNUAL PHYSICAL EXAM: Status: RESOLVED | Noted: 2017-10-06 | Resolved: 2018-01-08

## 2018-02-24 RX ORDER — ROSUVASTATIN CALCIUM 10 MG/1
10 TABLET, COATED ORAL NIGHTLY
Qty: 90 TABLET | Refills: 3 | Status: SHIPPED | OUTPATIENT
Start: 2018-02-24 | End: 2019-03-19 | Stop reason: SDUPTHER

## 2018-03-02 ENCOUNTER — OFFICE VISIT (OUTPATIENT)
Dept: OPHTHALMOLOGY | Facility: CLINIC | Age: 68
End: 2018-03-02
Payer: COMMERCIAL

## 2018-03-02 DIAGNOSIS — H25.13 NUCLEAR SCLEROSIS OF BOTH EYES: ICD-10-CM

## 2018-03-02 DIAGNOSIS — H40.1131 PRIMARY OPEN ANGLE GLAUCOMA OF BOTH EYES, MILD STAGE: Primary | ICD-10-CM

## 2018-03-02 PROCEDURE — 92012 INTRM OPH EXAM EST PATIENT: CPT | Mod: S$PBB,,, | Performed by: OPHTHALMOLOGY

## 2018-03-02 PROCEDURE — 99999 PR PBB SHADOW E&M-EST. PATIENT-LVL II: CPT | Mod: PBBFAC,,, | Performed by: OPHTHALMOLOGY

## 2018-03-02 PROCEDURE — 99212 OFFICE O/P EST SF 10 MIN: CPT | Mod: PBBFAC,PO | Performed by: OPHTHALMOLOGY

## 2018-03-24 RX ORDER — METOPROLOL SUCCINATE 50 MG/1
TABLET, EXTENDED RELEASE ORAL
Qty: 90 TABLET | Refills: 1 | Status: SHIPPED | OUTPATIENT
Start: 2018-03-24

## 2018-03-29 RX ORDER — LATANOPROST 50 UG/ML
SOLUTION/ DROPS OPHTHALMIC
Qty: 2.5 ML | Refills: 12 | Status: SHIPPED | OUTPATIENT
Start: 2018-03-29 | End: 2019-02-18 | Stop reason: SDUPTHER

## 2018-04-09 DIAGNOSIS — I25.10 CORONARY ARTERY DISEASE, ANGINA PRESENCE UNSPECIFIED, UNSPECIFIED VESSEL OR LESION TYPE, UNSPECIFIED WHETHER NATIVE OR TRANSPLANTED HEART: Primary | ICD-10-CM

## 2018-06-06 ENCOUNTER — PATIENT MESSAGE (OUTPATIENT)
Dept: CARDIOLOGY | Facility: CLINIC | Age: 68
End: 2018-06-06

## 2018-06-11 ENCOUNTER — TELEPHONE (OUTPATIENT)
Dept: CARDIOLOGY | Facility: CLINIC | Age: 68
End: 2018-06-11

## 2018-06-11 ENCOUNTER — OFFICE VISIT (OUTPATIENT)
Dept: CARDIOLOGY | Facility: CLINIC | Age: 68
End: 2018-06-11
Payer: MEDICARE

## 2018-06-11 VITALS
HEIGHT: 70 IN | DIASTOLIC BLOOD PRESSURE: 78 MMHG | WEIGHT: 197.56 LBS | HEART RATE: 56 BPM | BODY MASS INDEX: 28.28 KG/M2 | SYSTOLIC BLOOD PRESSURE: 134 MMHG

## 2018-06-11 DIAGNOSIS — I25.10 CORONARY ARTERY DISEASE INVOLVING NATIVE CORONARY ARTERY OF NATIVE HEART WITHOUT ANGINA PECTORIS: ICD-10-CM

## 2018-06-11 DIAGNOSIS — I10 ESSENTIAL HYPERTENSION: ICD-10-CM

## 2018-06-11 DIAGNOSIS — Z01.810 PREOP CARDIOVASCULAR EXAM: Primary | ICD-10-CM

## 2018-06-11 PROCEDURE — 93000 ELECTROCARDIOGRAM COMPLETE: CPT | Mod: S$GLB,,, | Performed by: NUCLEAR MEDICINE

## 2018-06-11 PROCEDURE — 99999 PR PBB SHADOW E&M-EST. PATIENT-LVL III: CPT | Mod: PBBFAC,,, | Performed by: INTERNAL MEDICINE

## 2018-06-11 PROCEDURE — 3078F DIAST BP <80 MM HG: CPT | Mod: CPTII,S$GLB,, | Performed by: INTERNAL MEDICINE

## 2018-06-11 PROCEDURE — 3075F SYST BP GE 130 - 139MM HG: CPT | Mod: CPTII,S$GLB,, | Performed by: INTERNAL MEDICINE

## 2018-06-11 PROCEDURE — 99213 OFFICE O/P EST LOW 20 MIN: CPT | Mod: S$GLB,,, | Performed by: INTERNAL MEDICINE

## 2018-06-11 RX ORDER — BIOTIN 1 MG
1000 TABLET ORAL 3 TIMES DAILY
COMMUNITY
End: 2019-06-24

## 2018-06-11 NOTE — PROGRESS NOTES
Subjective:   Patient ID:  Jose Luis Walker is a 68 y.o. male who presents for follow up of Pre-op Exam      69 yo male, preop clearance of nose procedure of mucus clearance. Followed with Dr. Shin.  [facility] nonobstructive CAD s/p [facility] in 2013, normal LVEF, HLD, HTN.  EKG today NSR  He denied chest pain, dyspnea on exertion, palpitation, fainting, PND, orthopnea, syncope and claudication.   No smoking  Walks two miles a day.        Past Medical History:   Diagnosis Date    CAD (coronary artery disease) 6/26/2014    nonobs cad via cath    Ex-smoker     FH: CAD (coronary artery disease) 12/5/2013    Glaucoma     Hyperlipidemia 12/5/2013    Hypertension 12/5/2013    Lumbar disc disorder with myelopathy     Sleep apnea        Past Surgical History:   Procedure Laterality Date    ARGON TRABECULOPLASTY - OU - BOTH EYES      CARDIAC CATHETERIZATION      TONSILLECTOMY, ADENOIDECTOMY         Social History   Substance Use Topics    Smoking status: Former Smoker     Packs/day: 0.50     Years: 10.00     Quit date: 11/19/1983    Smokeless tobacco: Never Used      Comment: quit 30 yrs ago    Alcohol use Yes      Comment: Seldom       Family History   Problem Relation Age of Onset    Diabetes Father     Heart disease Father 58        MI     Heart attack Father     Hypertension Mother     Hyperlipidemia Mother     Heart disease Brother     Cancer Paternal Grandfather         Pancreatic Cancer    Glaucoma Paternal Uncle     Glaucoma Maternal Grandfather     Cataracts Paternal Grandmother     Strabismus Neg Hx     Retinal detachment Neg Hx     Macular degeneration Neg Hx     Blindness Neg Hx     Amblyopia Neg Hx     Thyroid disease Neg Hx     Stroke Neg Hx          Review of Systems   Constitution: Negative for decreased appetite, diaphoresis, fever, weakness, malaise/fatigue and night sweats.   HENT: Negative for nosebleeds.    Eyes: Negative for blurred vision and double vision.   Cardiovascular: Negative for  chest pain, claudication, dyspnea on exertion, irregular heartbeat, leg swelling, near-syncope, orthopnea, palpitations, paroxysmal nocturnal dyspnea and syncope.   Respiratory: Negative for cough, shortness of breath, sleep disturbances due to breathing, snoring, sputum production and wheezing.    Endocrine: Negative for cold intolerance and polyuria.   Hematologic/Lymphatic: Does not bruise/bleed easily.   Skin: Negative for rash.   Musculoskeletal: Negative for back pain, falls, joint pain, joint swelling and neck pain.   Gastrointestinal: Negative for abdominal pain, heartburn, nausea and vomiting.   Genitourinary: Negative for dysuria, frequency and hematuria.   Neurological: Negative for difficulty with concentration, dizziness, focal weakness, headaches, light-headedness, numbness and seizures.   Psychiatric/Behavioral: Negative for depression, memory loss and substance abuse. The patient does not have insomnia.    Allergic/Immunologic: Negative for HIV exposure and hives.       Objective:   Physical Exam   Constitutional: He is oriented to person, place, and time. He appears well-nourished.   HENT:   Head: Normocephalic.   Eyes: Pupils are equal, round, and reactive to light.   Neck: Normal carotid pulses and no JVD present. Carotid bruit is not present. No thyromegaly present.   Cardiovascular: Normal rate, regular rhythm, normal heart sounds and normal pulses.   No extrasystoles are present. PMI is not displaced.  Exam reveals no gallop and no S3.    No murmur heard.  Pulmonary/Chest: Breath sounds normal. No stridor. No respiratory distress.   Abdominal: Soft. Bowel sounds are normal. There is no tenderness. There is no rebound.   Musculoskeletal: Normal range of motion.   Neurological: He is alert and oriented to person, place, and time.   Skin: Skin is intact. No rash noted.   Psychiatric: His behavior is normal.       Lab Results   Component Value Date    CHOL 135 10/06/2017    CHOL 196 02/01/2017     CHOL 216 (H) 07/14/2016     Lab Results   Component Value Date    HDL 55 10/06/2017    HDL 56 02/01/2017    HDL 56 07/14/2016     Lab Results   Component Value Date    LDLCALC 67.0 10/06/2017    LDLCALC 129.2 02/01/2017    LDLCALC 149.4 07/14/2016     Lab Results   Component Value Date    TRIG 65 10/06/2017    TRIG 54 02/01/2017    TRIG 53 07/14/2016     Lab Results   Component Value Date    CHOLHDL 40.7 10/06/2017    CHOLHDL 28.6 02/01/2017    CHOLHDL 25.9 07/14/2016       Chemistry        Component Value Date/Time     10/06/2017 0720    K 3.7 10/06/2017 0720     10/06/2017 0720    CO2 24 10/06/2017 0720    BUN 19 10/06/2017 0720    CREATININE 0.9 10/06/2017 0720     (H) 10/06/2017 0720        Component Value Date/Time    CALCIUM 9.9 10/06/2017 0720    ALKPHOS 52 (L) 10/06/2017 0720    AST 21 10/06/2017 0720    ALT 22 10/06/2017 0720    BILITOT 0.5 10/06/2017 0720    ESTGFRAFRICA >60 10/06/2017 0720    EGFRNONAA >60 10/06/2017 0720          Lab Results   Component Value Date    TSH 1.172 10/06/2017     No results found for: INR, PROTIME  Lab Results   Component Value Date    WBC 5.87 10/06/2017    HGB 14.4 10/06/2017    HCT 41.8 10/06/2017    MCV 91 10/06/2017     10/06/2017     BMP  Sodium   Date Value Ref Range Status   10/06/2017 141 136 - 145 mmol/L Final     Potassium   Date Value Ref Range Status   10/06/2017 3.7 3.5 - 5.1 mmol/L Final     Chloride   Date Value Ref Range Status   10/06/2017 107 95 - 110 mmol/L Final     CO2   Date Value Ref Range Status   10/06/2017 24 23 - 29 mmol/L Final     BUN, Bld   Date Value Ref Range Status   10/06/2017 19 8 - 23 mg/dL Final     Creatinine   Date Value Ref Range Status   10/06/2017 0.9 0.5 - 1.4 mg/dL Final     Calcium   Date Value Ref Range Status   10/06/2017 9.9 8.7 - 10.5 mg/dL Final     Anion Gap   Date Value Ref Range Status   10/06/2017 10 8 - 16 mmol/L Final     eGFR if    Date Value Ref Range Status   10/06/2017 >60  >60 mL/min/1.73 m^2 Final     eGFR if non    Date Value Ref Range Status   10/06/2017 >60 >60 mL/min/1.73 m^2 Final     Comment:     Calculation used to obtain the estimated glomerular filtration  rate (eGFR) is the CKD-EPI equation. Since race is unknown   in our information system, the eGFR values for   -American and Non--American patients are given   for each creatinine result.       CrCl cannot be calculated (Patient's most recent lab result is older than the maximum 7 days allowed.).     Assessment:      1. Preop cardiovascular exam    2. Coronary artery disease involving native coronary artery of native heart without angina pectoris    3. Essential hypertension        Plan:   Low periop risk of CV events for non-high risk procedure.  Good functional and exercise capacity.  No chest pain, active arrhythmia and CHF symptoms.  Ok to proceed the scheduled surgery without further cardiac study.  OK to hold Aspirin 5 to 7 days before the procedure and resume ASAP postop.  Continue Metoprolol and Statin periop.  F/u with Dr. Shin in 1 yr

## 2018-06-19 ENCOUNTER — TELEPHONE (OUTPATIENT)
Dept: CARDIOLOGY | Facility: CLINIC | Age: 68
End: 2018-06-19

## 2018-06-19 NOTE — TELEPHONE ENCOUNTER
Spoke with pt who wanted his EKG faxed to dr. Mcnamara.  This was done on 06/19/2018.    ----- Message from Gilberto Brooks sent at 6/19/2018 11:09 AM CDT -----  Contact: pt  He's calling in regards to faxing the EKG results to Dr. Jet Mcnamara (ENT), pls call pt at [phone] (home)

## 2018-08-08 ENCOUNTER — OFFICE VISIT (OUTPATIENT)
Dept: OPHTHALMOLOGY | Facility: CLINIC | Age: 68
End: 2018-08-08
Payer: MEDICARE

## 2018-08-08 DIAGNOSIS — H40.1131 PRIMARY OPEN ANGLE GLAUCOMA OF BOTH EYES, MILD STAGE: Primary | ICD-10-CM

## 2018-08-08 DIAGNOSIS — H25.13 NUCLEAR SCLEROSIS, BILATERAL: ICD-10-CM

## 2018-08-08 PROCEDURE — 99999 PR PBB SHADOW E&M-EST. PATIENT-LVL II: CPT | Mod: PBBFAC,,, | Performed by: OPHTHALMOLOGY

## 2018-08-08 PROCEDURE — 92014 COMPRE OPH EXAM EST PT 1/>: CPT | Mod: S$GLB,,, | Performed by: OPHTHALMOLOGY

## 2018-08-08 PROCEDURE — 92250 FUNDUS PHOTOGRAPHY W/I&R: CPT | Mod: S$GLB,,, | Performed by: OPHTHALMOLOGY

## 2018-08-08 PROCEDURE — 92083 EXTENDED VISUAL FIELD XM: CPT | Mod: S$GLB,,, | Performed by: OPHTHALMOLOGY

## 2018-08-08 NOTE — PROGRESS NOTES
SUBJECTIVE:   Jose Luis Walker is a 68 y.o. male   Uncorrected distance visual acuity was 20/40 in the right eye and 20/30 in the left eye.   Chief Complaint   Patient presents with    Glaucoma     4 month HVF/FD, Latanoprost QHS OU        HPI:  HPI     Glaucoma    Additional comments: 4 month HVF/FD, Latanoprost QHS OU           Comments   Pt states his vision is not quite as clear as it has been. Hard to see the   choir when at [employer], using +3.00 readers for small print. Doesn't miss   his eye drops, makes sure he get them in. He was told by a pharmacy tech   that the Latanoprost was not good after 30 days, he usually refill on a 30   day schedule so he rarely goes over 30 days.     1. Mild COAG pre SLT 27/28 (goal = 21)  ( Steroid responder)  SLT OD 11/09 (27-17) + 9/27/17 (29-19)  SLT OS 9/09 (28-18) + 9/27/17 (30-21)  2. Trace NSC  3. Fhx DM    Latanoprost QHS OU       Last edited by Tato Carey on 8/8/2018  1:14 PM. (History)        Assessment /Plan :  1. Primary open angle glaucoma of both eyes, mild stage Doing well, IOP within acceptable range relative to target IOP and no evidence of progression. Continue current treatment. Reviewed importance of continued compliance with treatment and follow up.     2. Nuclear sclerosis, bilateral - not visually significant. Observe.       Return to clinic in 4 months  or as needed.  With IOP Check and GOCT

## 2018-09-17 RX ORDER — METOPROLOL SUCCINATE 50 MG/1
TABLET, EXTENDED RELEASE ORAL
Qty: 90 TABLET | Refills: 1 | OUTPATIENT
Start: 2018-09-17

## 2018-10-01 RX ORDER — METOPROLOL SUCCINATE 50 MG/1
TABLET, EXTENDED RELEASE ORAL
Qty: 90 TABLET | Refills: 1 | OUTPATIENT
Start: 2018-10-01

## 2019-02-18 RX ORDER — LATANOPROST 50 UG/ML
SOLUTION/ DROPS OPHTHALMIC
Qty: 2.5 ML | Refills: 12 | Status: SHIPPED | OUTPATIENT
Start: 2019-02-18 | End: 2019-12-06 | Stop reason: SDUPTHER

## 2019-03-19 RX ORDER — ROSUVASTATIN CALCIUM 10 MG/1
10 TABLET, COATED ORAL NIGHTLY
Qty: 90 TABLET | Refills: 3 | Status: SHIPPED | OUTPATIENT
Start: 2019-03-19 | End: 2020-04-06

## 2019-03-22 ENCOUNTER — OFFICE VISIT (OUTPATIENT)
Dept: OPHTHALMOLOGY | Facility: CLINIC | Age: 69
End: 2019-03-22
Payer: MEDICARE

## 2019-03-22 DIAGNOSIS — H40.1131 PRIMARY OPEN ANGLE GLAUCOMA OF BOTH EYES, MILD STAGE: Primary | ICD-10-CM

## 2019-03-22 DIAGNOSIS — H25.13 NUCLEAR SCLEROSIS, BILATERAL: ICD-10-CM

## 2019-03-22 PROCEDURE — 99999 PR PBB SHADOW E&M-EST. PATIENT-LVL II: ICD-10-PCS | Mod: PBBFAC,,, | Performed by: OPHTHALMOLOGY

## 2019-03-22 PROCEDURE — 99999 PR PBB SHADOW E&M-EST. PATIENT-LVL II: CPT | Mod: PBBFAC,,, | Performed by: OPHTHALMOLOGY

## 2019-03-22 PROCEDURE — 92133 POSTERIOR SEGMENT OCT OPTIC NERVE(OCULAR COHERENCE TOMOGRAPHY) - OU - BOTH EYES: ICD-10-PCS | Mod: S$GLB,,, | Performed by: OPHTHALMOLOGY

## 2019-03-22 PROCEDURE — 92012 INTRM OPH EXAM EST PATIENT: CPT | Mod: S$GLB,,, | Performed by: OPHTHALMOLOGY

## 2019-03-22 PROCEDURE — 92133 CPTRZD OPH DX IMG PST SGM ON: CPT | Mod: S$GLB,,, | Performed by: OPHTHALMOLOGY

## 2019-03-22 PROCEDURE — 92012 PR EYE EXAM, EST PATIENT,INTERMED: ICD-10-PCS | Mod: S$GLB,,, | Performed by: OPHTHALMOLOGY

## 2019-03-22 NOTE — PROGRESS NOTES
SUBJECTIVE:   Jose Luis Walker is a 68 y.o. male   Uncorrected distance visual acuity was 20/30 -2 in the right eye and 20/25 -2 in the left eye.   Chief Complaint   Patient presents with    Glaucoma     4 Months GOCT & IOP CHECK        HPI:  HPI     Glaucoma      Additional comments: 4 Months GOCT & IOP CHECK              Comments     Patient States Vision Stable & No Discomfort 100% Drop Compliance     1. Mild COAG pre SLT 27/28 (goal = 21)  ( Steroid responder)  SLT OD 11/09 (27-17) + 9/27/17 (29-19)  SLT OS 9/09 (28-18) + 9/27/17 (30-21)  2. Trace NSC  3. Fhx DM    Latanoprost QHS OU          Last edited by Haritha Vega on 3/22/2019  8:30 AM. (History)        Assessment /Plan :  1. Primary open angle glaucoma of both eyes, mild stage Doing well, IOP within acceptable range relative to target IOP and no evidence of progression. Continue current treatment. Reviewed importance of continued compliance with treatment and follow up.     2. Nuclear sclerosis, bilateral Patient does reports mild visual decline from incipient cataractous changes, but not sufficient to affect activities of daily living. I recommend monitoring visual status and follow up when visual symptoms worsen.       Return to clinic in 4 months  or as needed.  With Dilation, HVF 24-2 and SDP

## 2019-05-28 ENCOUNTER — PATIENT OUTREACH (OUTPATIENT)
Dept: ADMINISTRATIVE | Facility: HOSPITAL | Age: 69
End: 2019-05-28

## 2019-06-24 ENCOUNTER — OFFICE VISIT (OUTPATIENT)
Dept: CARDIOLOGY | Facility: CLINIC | Age: 69
End: 2019-06-24
Payer: MEDICARE

## 2019-06-24 ENCOUNTER — CLINICAL SUPPORT (OUTPATIENT)
Dept: CARDIOLOGY | Facility: CLINIC | Age: 69
End: 2019-06-24
Payer: MEDICARE

## 2019-06-24 VITALS
WEIGHT: 199.06 LBS | SYSTOLIC BLOOD PRESSURE: 120 MMHG | HEIGHT: 70 IN | DIASTOLIC BLOOD PRESSURE: 78 MMHG | HEART RATE: 54 BPM | BODY MASS INDEX: 28.5 KG/M2

## 2019-06-24 DIAGNOSIS — I10 ESSENTIAL HYPERTENSION: Primary | ICD-10-CM

## 2019-06-24 DIAGNOSIS — I25.10 CORONARY ARTERY DISEASE, ANGINA PRESENCE UNSPECIFIED, UNSPECIFIED VESSEL OR LESION TYPE, UNSPECIFIED WHETHER NATIVE OR TRANSPLANTED HEART: ICD-10-CM

## 2019-06-24 DIAGNOSIS — I25.10 CORONARY ARTERY DISEASE, ANGINA PRESENCE UNSPECIFIED, UNSPECIFIED VESSEL OR LESION TYPE, UNSPECIFIED WHETHER NATIVE OR TRANSPLANTED HEART: Primary | ICD-10-CM

## 2019-06-24 DIAGNOSIS — E78.5 HYPERLIPIDEMIA, UNSPECIFIED HYPERLIPIDEMIA TYPE: ICD-10-CM

## 2019-06-24 DIAGNOSIS — R73.03 PREDIABETES: ICD-10-CM

## 2019-06-24 PROCEDURE — 3074F PR MOST RECENT SYSTOLIC BLOOD PRESSURE < 130 MM HG: ICD-10-PCS | Mod: CPTII,S$GLB,, | Performed by: INTERNAL MEDICINE

## 2019-06-24 PROCEDURE — 3074F SYST BP LT 130 MM HG: CPT | Mod: CPTII,S$GLB,, | Performed by: INTERNAL MEDICINE

## 2019-06-24 PROCEDURE — 99214 PR OFFICE/OUTPT VISIT, EST, LEVL IV, 30-39 MIN: ICD-10-PCS | Mod: S$GLB,,, | Performed by: INTERNAL MEDICINE

## 2019-06-24 PROCEDURE — 93005 EKG 12-LEAD: ICD-10-PCS | Mod: S$GLB,,, | Performed by: INTERNAL MEDICINE

## 2019-06-24 PROCEDURE — 99999 PR PBB SHADOW E&M-EST. PATIENT-LVL III: ICD-10-PCS | Mod: PBBFAC,,, | Performed by: INTERNAL MEDICINE

## 2019-06-24 PROCEDURE — 99999 PR PBB SHADOW E&M-EST. PATIENT-LVL III: CPT | Mod: PBBFAC,,, | Performed by: INTERNAL MEDICINE

## 2019-06-24 PROCEDURE — 93005 ELECTROCARDIOGRAM TRACING: CPT | Mod: S$GLB,,, | Performed by: INTERNAL MEDICINE

## 2019-06-24 PROCEDURE — 3078F PR MOST RECENT DIASTOLIC BLOOD PRESSURE < 80 MM HG: ICD-10-PCS | Mod: CPTII,S$GLB,, | Performed by: INTERNAL MEDICINE

## 2019-06-24 PROCEDURE — 99214 OFFICE O/P EST MOD 30 MIN: CPT | Mod: S$GLB,,, | Performed by: INTERNAL MEDICINE

## 2019-06-24 PROCEDURE — 3078F DIAST BP <80 MM HG: CPT | Mod: CPTII,S$GLB,, | Performed by: INTERNAL MEDICINE

## 2019-06-24 PROCEDURE — 93010 ELECTROCARDIOGRAM REPORT: CPT | Mod: S$GLB,,, | Performed by: NUCLEAR MEDICINE

## 2019-06-24 PROCEDURE — 93010 EKG 12-LEAD: ICD-10-PCS | Mod: S$GLB,,, | Performed by: NUCLEAR MEDICINE

## 2019-06-24 NOTE — PROGRESS NOTES
Subjective:   Patient ID:  Jose Luis Walker is a 69 y.o. male who presents for follow up of Annual Exam and Coronary Artery Disease      70 yo male, 1 yr f/u  [facility] nonobstructive CAD s/p [facility] in , normal LVEF, HLD, HTN.  EKG today NSR. Recent microhematuria and cystoscopy negative  He denied chest pain, dyspnea on exertion, palpitation, fainting, PND, orthopnea, syncope and claudication.   No smoking  Limited walk due to knee meniscus injury.  ekg NSR, bradycardia HR 54 bpm  Med compliance   LDL 53 and glucose 93 in  at [facility]      Past Medical History:   Diagnosis Date    CAD (coronary artery disease) 2014    nonobs cad via cath    Ex-smoker     FH: CAD (coronary artery disease) 2013    Glaucoma     Hyperlipidemia 2013    Hypertension 2013    Lumbar disc disorder with myelopathy     Sleep apnea        Past Surgical History:   Procedure Laterality Date    CARDIAC CATHETERIZATION      CATHETERIZATION, HEART, LEFT Left 2013    Performed by [facility] at [facility] C[facility]    PTCA, USING BALLOON Left 2013    Performed by Patrick [facility] MD at [facility] CATH LAB    SLT - OU - BOTH EYES      TONSILLECTOMY, ADENOIDECTOMY         Social History     Tobacco Use    Smoking status: Former Smoker     Packs/day: 0.50     Years: 10.00     Pack years: 5.00     Last attempt to quit: 1983     Years since quittin.6    Smokeless tobacco: Never Used    Tobacco comment: quit 30 yrs ago   Substance Use Topics    Alcohol use: Yes     Comment: Seldom    Drug use: No       Family History   Problem Relation Age of Onset    Diabetes Father     Heart disease Father 58        MI     Heart attack Father     Hypertension Mother     Hyperlipidemia Mother     Heart disease Brother     Cancer Paternal Grandfather         Pancreatic Cancer    Glaucoma Paternal Uncle     Glaucoma Maternal Grandfather     Cataracts Paternal Grandmother     Strabismus Neg Hx     Retinal detachment  Neg Hx     Macular degeneration Neg Hx     Blindness Neg Hx     Amblyopia Neg Hx     Thyroid disease Neg Hx     Stroke Neg Hx          Review of Systems   Constitution: Negative for decreased appetite, diaphoresis, fever, malaise/fatigue and night sweats.   HENT: Negative for nosebleeds.    Eyes: Negative for blurred vision and double vision.   Cardiovascular: Negative for chest pain, claudication, dyspnea on exertion, irregular heartbeat, leg swelling, near-syncope, orthopnea, palpitations, paroxysmal nocturnal dyspnea and syncope.   Respiratory: Negative for cough, shortness of breath, sleep disturbances due to breathing, snoring, sputum production and wheezing.    Endocrine: Negative for cold intolerance and polyuria.   Hematologic/Lymphatic: Does not bruise/bleed easily.   Skin: Negative for rash.   Musculoskeletal: Positive for arthritis and joint pain. Negative for back pain, falls, joint swelling and neck pain.   Gastrointestinal: Negative for abdominal pain, heartburn, nausea and vomiting.   Genitourinary: Negative for dysuria, frequency and hematuria.   Neurological: Negative for difficulty with concentration, dizziness, focal weakness, headaches, light-headedness, numbness, seizures and weakness.   Psychiatric/Behavioral: Negative for depression, memory loss and substance abuse. The patient does not have insomnia.    Allergic/Immunologic: Negative for HIV exposure and hives.       Objective:   Physical Exam   Constitutional: He is oriented to person, place, and time. He appears well-nourished.   HENT:   Head: Normocephalic.   Eyes: Pupils are equal, round, and reactive to light.   Neck: Normal carotid pulses and no JVD present. Carotid bruit is not present. No thyromegaly present.   Cardiovascular: Normal rate, regular rhythm, normal heart sounds and normal pulses.  No extrasystoles are present. PMI is not displaced. Exam reveals no gallop and no S3.   No murmur heard.  Pulmonary/Chest: Breath sounds  normal. No stridor. No respiratory distress.   Abdominal: Soft. Bowel sounds are normal. There is no tenderness. There is no rebound.   Musculoskeletal: Normal range of motion.   Neurological: He is alert and oriented to person, place, and time.   Skin: Skin is intact. No rash noted.   Psychiatric: His behavior is normal.       Lab Results   Component Value Date    CHOL 135 10/06/2017    CHOL 196 02/01/2017    CHOL 216 (H) 07/14/2016     Lab Results   Component Value Date    HDL 55 10/06/2017    HDL 56 02/01/2017    HDL 56 07/14/2016     Lab Results   Component Value Date    LDLCALC 67.0 10/06/2017    LDLCALC 129.2 02/01/2017    LDLCALC 149.4 07/14/2016     Lab Results   Component Value Date    TRIG 65 10/06/2017    TRIG 54 02/01/2017    TRIG 53 07/14/2016     Lab Results   Component Value Date    CHOLHDL 40.7 10/06/2017    CHOLHDL 28.6 02/01/2017    CHOLHDL 25.9 07/14/2016       Chemistry        Component Value Date/Time     10/06/2017 0720    K 3.7 10/06/2017 0720     10/06/2017 0720    CO2 24 10/06/2017 0720    BUN 19 10/06/2017 0720    CREATININE 0.9 10/06/2017 0720     (H) 10/06/2017 0720        Component Value Date/Time    CALCIUM 9.9 10/06/2017 0720    ALKPHOS 52 (L) 10/06/2017 0720    AST 21 10/06/2017 0720    ALT 22 10/06/2017 0720    BILITOT 0.5 10/06/2017 0720    ESTGFRAFRICA >60 10/06/2017 0720    EGFRNONAA >60 10/06/2017 0720          Lab Results   Component Value Date    HGBA1C 5.6 10/06/2017     Lab Results   Component Value Date    TSH 1.172 10/06/2017     No results found for: INR, PROTIME  Lab Results   Component Value Date    WBC 5.87 10/06/2017    HGB 14.4 10/06/2017    HCT 41.8 10/06/2017    MCV 91 10/06/2017     10/06/2017     BMP  Sodium   Date Value Ref Range Status   10/06/2017 141 136 - 145 mmol/L Final     Potassium   Date Value Ref Range Status   10/06/2017 3.7 3.5 - 5.1 mmol/L Final     Chloride   Date Value Ref Range Status   10/06/2017 107 95 - 110 mmol/L  Final     CO2   Date Value Ref Range Status   10/06/2017 24 23 - 29 mmol/L Final     BUN, Bld   Date Value Ref Range Status   10/06/2017 19 8 - 23 mg/dL Final     Creatinine   Date Value Ref Range Status   10/06/2017 0.9 0.5 - 1.4 mg/dL Final     Calcium   Date Value Ref Range Status   10/06/2017 9.9 8.7 - 10.5 mg/dL Final     Anion Gap   Date Value Ref Range Status   10/06/2017 10 8 - 16 mmol/L Final     eGFR if    Date Value Ref Range Status   10/06/2017 >60 >60 mL/min/1.73 m^2 Final     eGFR if non    Date Value Ref Range Status   10/06/2017 >60 >60 mL/min/1.73 m^2 Final     Comment:     Calculation used to obtain the estimated glomerular filtration  rate (eGFR) is the CKD-EPI equation. Since race is unknown   in our information system, the eGFR values for   -American and Non--American patients are given   for each creatinine result.       BNP  @LABRCNTIP(BNP,BNPTRIAGEBLO)@  @LABRCNTIP(troponini)@  CrCl cannot be calculated (Patient's most recent lab result is older than the maximum 7 days allowed.).  No results found in the last 24 hours.  No results found in the last 24 hours.  No results found in the last 24 hours.    Assessment:      1. Essential hypertension    2. Hyperlipidemia, unspecified hyperlipidemia type    3. Prediabetes      BP, LDL and A1c wnl  No cp and CHF  Plan:   Continue ASA, statin and BB  Recommend heart-healthy diet, weight control and regular exercise.  Sharlene. Risk modification.   RTC in 1 yr  I have reviewed all pertinent labs and cardiac studies. Plans and recommendations have been formulated under my direct supervision. All questions answered and patient voiced understanding. Patient to continue current medications.

## 2019-07-29 ENCOUNTER — OFFICE VISIT (OUTPATIENT)
Dept: OPHTHALMOLOGY | Facility: CLINIC | Age: 69
End: 2019-07-29
Payer: MEDICARE

## 2019-07-29 DIAGNOSIS — H10.10 ALLERGIC CONJUNCTIVITIS, UNSPECIFIED LATERALITY: ICD-10-CM

## 2019-07-29 DIAGNOSIS — H25.13 NUCLEAR SCLEROSIS, BILATERAL: ICD-10-CM

## 2019-07-29 DIAGNOSIS — H40.1131 PRIMARY OPEN ANGLE GLAUCOMA OF BOTH EYES, MILD STAGE: Primary | ICD-10-CM

## 2019-07-29 PROCEDURE — 92083 HUMPHREY VISUAL FIELD - OU - BOTH EYES: ICD-10-PCS | Mod: S$GLB,,, | Performed by: OPHTHALMOLOGY

## 2019-07-29 PROCEDURE — 99999 PR PBB SHADOW E&M-EST. PATIENT-LVL II: ICD-10-PCS | Mod: PBBFAC,,, | Performed by: OPHTHALMOLOGY

## 2019-07-29 PROCEDURE — 92250 FUNDUS PHOTOGRAPHY W/I&R: CPT | Mod: S$GLB,,, | Performed by: OPHTHALMOLOGY

## 2019-07-29 PROCEDURE — 92014 PR EYE EXAM, EST PATIENT,COMPREHESV: ICD-10-PCS | Mod: S$GLB,,, | Performed by: OPHTHALMOLOGY

## 2019-07-29 PROCEDURE — 99999 PR PBB SHADOW E&M-EST. PATIENT-LVL II: CPT | Mod: PBBFAC,,, | Performed by: OPHTHALMOLOGY

## 2019-07-29 PROCEDURE — 92014 COMPRE OPH EXAM EST PT 1/>: CPT | Mod: S$GLB,,, | Performed by: OPHTHALMOLOGY

## 2019-07-29 PROCEDURE — 92250 COLOR FUNDUS PHOTOGRAPHY - OU - BOTH EYES: ICD-10-PCS | Mod: S$GLB,,, | Performed by: OPHTHALMOLOGY

## 2019-07-29 PROCEDURE — 92083 EXTENDED VISUAL FIELD XM: CPT | Mod: S$GLB,,, | Performed by: OPHTHALMOLOGY

## 2019-07-29 NOTE — PROGRESS NOTES
"SUBJECTIVE:   Jose Luis Walker is a 69 y.o. male   Uncorrected distance visual acuity was 20/40 in the right eye and 20/25 in the left eye.   Chief Complaint   Patient presents with    Glaucoma        HPI:  HPI     Patient States "Vision is going down some, but I am old"  Some decrease in DVA lately , some trouble w/ glare at pm  OD tearing and OU itchy x few days - really bothering me     1. Mild COAG pre SLT 27/28 (goal = 21)  ( Steroid responder)  SLT OD 11/09 (27-17) + 9/27/17 (29-19)  SLT OS 9/09 (28-18) + 9/27/17 (30-21)  2. Trace NSC  3. Fhx DM    Latanoprost QHS OU    Last edited by ROMI Carias on 7/29/2019  9:25 AM. (History)        Assessment /Plan :  1. Primary open angle glaucoma of both eyes, mild stage Doing well, IOP within acceptable range relative to target IOP and no evidence of progression. Continue current treatment. Reviewed importance of continued compliance with treatment and follow up.     2. Nuclear sclerosis, bilateral  - not visually significant. Observe.     3. Allergic conjunctivitis, unspecified laterality recommend Zaditor OU BID prn     Return to clinic in 4 months  or as needed.  With IOP Check and GOCT              "

## 2019-12-05 ENCOUNTER — OFFICE VISIT (OUTPATIENT)
Dept: OPHTHALMOLOGY | Facility: CLINIC | Age: 69
End: 2019-12-05
Payer: MEDICARE

## 2019-12-05 DIAGNOSIS — H25.13 NUCLEAR SCLEROSIS, BILATERAL: ICD-10-CM

## 2019-12-05 DIAGNOSIS — H40.1131 PRIMARY OPEN ANGLE GLAUCOMA OF BOTH EYES, MILD STAGE: Primary | ICD-10-CM

## 2019-12-05 DIAGNOSIS — H26.9 CORTICAL CATARACT OF BOTH EYES: ICD-10-CM

## 2019-12-05 PROCEDURE — 92012 PR EYE EXAM, EST PATIENT,INTERMED: ICD-10-PCS | Mod: S$GLB,,, | Performed by: OPHTHALMOLOGY

## 2019-12-05 PROCEDURE — 92133 POSTERIOR SEGMENT OCT OPTIC NERVE(OCULAR COHERENCE TOMOGRAPHY) - OU - BOTH EYES: ICD-10-PCS | Mod: S$GLB,,, | Performed by: OPHTHALMOLOGY

## 2019-12-05 PROCEDURE — 92133 CPTRZD OPH DX IMG PST SGM ON: CPT | Mod: S$GLB,,, | Performed by: OPHTHALMOLOGY

## 2019-12-05 PROCEDURE — 99999 PR PBB SHADOW E&M-EST. PATIENT-LVL II: ICD-10-PCS | Mod: PBBFAC,,, | Performed by: OPHTHALMOLOGY

## 2019-12-05 PROCEDURE — 92012 INTRM OPH EXAM EST PATIENT: CPT | Mod: S$GLB,,, | Performed by: OPHTHALMOLOGY

## 2019-12-05 PROCEDURE — 99999 PR PBB SHADOW E&M-EST. PATIENT-LVL II: CPT | Mod: PBBFAC,,, | Performed by: OPHTHALMOLOGY

## 2019-12-05 NOTE — PROGRESS NOTES
SUBJECTIVE:   Jose Luis Walker is a 69 y.o. male   Uncorrected distance visual acuity was 20/40 -2 in the right eye and 20/30 -2 in the left eye.   Chief Complaint   Patient presents with    Glaucoma     4M GOCT and IOP check        HPI:  HPI     Glaucoma      Additional comments: 4M GOCT and IOP check              Comments     The patient states his eyes are doing well and he denies any ocular pain   at this time.  100% drop compliance    1. Mild COAG pre SLT 27/28 (goal = 21)  ( Steroid responder)  SLT OD 11/09 (27-17) + 9/27/17 (29-19)  SLT OS 9/09 (28-18) + 9/27/17 (30-21)  2. Trace NSC  3. Fhx DM    Latanoprost QHS OU          Last edited by Yudi Mcdonnell on 12/5/2019  8:38 AM. (History)        Assessment /Plan :  1. Primary open angle glaucoma of both eyes, mild stage Doing well, IOP within acceptable range relative to target IOP and no evidence of progression. Continue current treatment. Reviewed importance of continued compliance with treatment and follow up.     2. Nuclear sclerosis, bilateral monitor for now   3. Cortical cataract of both eyes monitor for now     Return to clinic in 4 months  or as needed.  With IOP Check

## 2019-12-06 RX ORDER — LATANOPROST 50 UG/ML
SOLUTION/ DROPS OPHTHALMIC
Qty: 7.5 ML | Refills: 4 | Status: SHIPPED | OUTPATIENT
Start: 2019-12-06 | End: 2020-06-22 | Stop reason: SDUPTHER

## 2020-04-06 RX ORDER — ROSUVASTATIN CALCIUM 10 MG/1
10 TABLET, COATED ORAL NIGHTLY
Qty: 90 TABLET | Refills: 3 | Status: SHIPPED | OUTPATIENT
Start: 2020-04-06 | End: 2021-03-23

## 2020-06-22 ENCOUNTER — OFFICE VISIT (OUTPATIENT)
Dept: OPHTHALMOLOGY | Facility: CLINIC | Age: 70
End: 2020-06-22
Payer: MEDICARE

## 2020-06-22 DIAGNOSIS — H40.1131 PRIMARY OPEN ANGLE GLAUCOMA OF BOTH EYES, MILD STAGE: Primary | ICD-10-CM

## 2020-06-22 DIAGNOSIS — H25.13 NUCLEAR SCLEROSIS, BILATERAL: ICD-10-CM

## 2020-06-22 DIAGNOSIS — H26.9 CORTICAL CATARACT OF BOTH EYES: ICD-10-CM

## 2020-06-22 PROCEDURE — 99999 PR PBB SHADOW E&M-EST. PATIENT-LVL II: CPT | Mod: PBBFAC,,, | Performed by: OPHTHALMOLOGY

## 2020-06-22 PROCEDURE — 92012 PR EYE EXAM, EST PATIENT,INTERMED: ICD-10-PCS | Mod: S$GLB,,, | Performed by: OPHTHALMOLOGY

## 2020-06-22 PROCEDURE — 99999 PR PBB SHADOW E&M-EST. PATIENT-LVL II: ICD-10-PCS | Mod: PBBFAC,,, | Performed by: OPHTHALMOLOGY

## 2020-06-22 PROCEDURE — 92012 INTRM OPH EXAM EST PATIENT: CPT | Mod: S$GLB,,, | Performed by: OPHTHALMOLOGY

## 2020-06-22 RX ORDER — LATANOPROST 50 UG/ML
1 SOLUTION/ DROPS OPHTHALMIC NIGHTLY
Qty: 7.5 ML | Refills: 4 | Status: SHIPPED | OUTPATIENT
Start: 2020-06-22 | End: 2021-04-16 | Stop reason: SDUPTHER

## 2020-06-22 NOTE — PROGRESS NOTES
SUBJECTIVE  Ray GILMAR Walker is 70 y.o. male  Uncorrected distance visual acuity was 20/40 +1 in the right eye and 20/30 -2 in the left eye.   Chief Complaint   Patient presents with    Glaucoma     6 month IOP check, Latanoprost QHS OU          HPI     Glaucoma      Additional comments: 6 month IOP check, Latanoprost QHS OU              Comments     Pt states no problems since his last visit. Compliant with his drops. May   need a refill on his drops. Sometimes he has trouble seeing the words on   the tv. Not wearing any correction. No ocular pain or irritation.     1. Mild COAG pre SLT 27/28 (goal = 21)  ( Steroid responder)  SLT OD 11/09 (27-17) + 9/27/17 (29-19)  SLT OS 9/09 (28-18) + 9/27/17 (30-21)  2. Trace NSC  3. Fhx DM    Latanoprost QHS OU          Last edited by Tato Carey on 6/22/2020  8:32 AM. (History)         Assessment /Plan :  1. Primary open angle glaucoma of both eyes, mild stage IOP not within acceptable range relative to target IOP with risk of irreversible visual loss. Better IOP control is recommended. Discussed options, risks, and benefits of additional medication, SLT laser, and/or incisional glaucoma surgery. Reviewed importance of continued compliance with treatment and follow up.     Patient chooses defer and recheck IOP next visit. Consider repeat SLT      2. Nuclear sclerosis, bilateral - Nuclear sclerotic cataract - not visually significant. Observe.       3. Cortical cataract of both eyes -see above      Return to clinic in 4 months  or as needed.  With GOCT, Dilation, HVF 24-2 and SDP

## 2020-06-25 DIAGNOSIS — I10 ESSENTIAL HYPERTENSION: Primary | ICD-10-CM

## 2020-06-26 ENCOUNTER — TELEPHONE (OUTPATIENT)
Dept: CARDIOLOGY | Facility: CLINIC | Age: 70
End: 2020-06-26

## 2020-06-26 NOTE — TELEPHONE ENCOUNTER
Telephoned patient about appointment with Dr. David at 1:40 pm. Pt states has another cardiologist Dr. Srinivasan at the  Cardiology.

## 2020-10-23 ENCOUNTER — OFFICE VISIT (OUTPATIENT)
Dept: OPHTHALMOLOGY | Facility: CLINIC | Age: 70
End: 2020-10-23
Payer: MEDICARE

## 2020-10-23 DIAGNOSIS — H25.13 NUCLEAR SCLEROSIS, BILATERAL: ICD-10-CM

## 2020-10-23 DIAGNOSIS — H40.1131 PRIMARY OPEN ANGLE GLAUCOMA OF BOTH EYES, MILD STAGE: Primary | ICD-10-CM

## 2020-10-23 DIAGNOSIS — H26.9 CORTICAL CATARACT OF BOTH EYES: ICD-10-CM

## 2020-10-23 PROCEDURE — 92014 PR EYE EXAM, EST PATIENT,COMPREHESV: ICD-10-PCS | Mod: S$GLB,,, | Performed by: OPHTHALMOLOGY

## 2020-10-23 PROCEDURE — 92133 POSTERIOR SEGMENT OCT OPTIC NERVE(OCULAR COHERENCE TOMOGRAPHY) - OU - BOTH EYES: ICD-10-PCS | Mod: S$GLB,,, | Performed by: OPHTHALMOLOGY

## 2020-10-23 PROCEDURE — 92014 COMPRE OPH EXAM EST PT 1/>: CPT | Mod: S$GLB,,, | Performed by: OPHTHALMOLOGY

## 2020-10-23 PROCEDURE — 92083 EXTENDED VISUAL FIELD XM: CPT | Mod: S$GLB,,, | Performed by: OPHTHALMOLOGY

## 2020-10-23 PROCEDURE — 92133 CPTRZD OPH DX IMG PST SGM ON: CPT | Mod: S$GLB,,, | Performed by: OPHTHALMOLOGY

## 2020-10-23 PROCEDURE — 99999 PR PBB SHADOW E&M-EST. PATIENT-LVL III: ICD-10-PCS | Mod: PBBFAC,,, | Performed by: OPHTHALMOLOGY

## 2020-10-23 PROCEDURE — 99999 PR PBB SHADOW E&M-EST. PATIENT-LVL III: CPT | Mod: PBBFAC,,, | Performed by: OPHTHALMOLOGY

## 2020-10-23 PROCEDURE — 92083 HUMPHREY VISUAL FIELD - OU - BOTH EYES: ICD-10-PCS | Mod: S$GLB,,, | Performed by: OPHTHALMOLOGY

## 2020-10-23 RX ORDER — KETOROLAC TROMETHAMINE 5 MG/ML
SOLUTION OPHTHALMIC
Qty: 1 BOTTLE | Refills: 0 | Status: SHIPPED | OUTPATIENT
Start: 2020-10-23

## 2020-10-23 NOTE — PROGRESS NOTES
SUBJECTIVE  Jose Luis Walker is 70 y.o. male  Uncorrected distance visual acuity was 20/25 -2 in the right eye and 20/40 +1 in the left eye.   Chief Complaint   Patient presents with    Glaucoma          HPI     Pt here for 4m GOCT HVF chk. No pain or discomfort. VA stable. 100%   compliant with gtts.     1. Mild COAG pre SLT 27/28 (goal = 21)  ( Steroid responder)  SLT OD 11/09 (27-17) + 9/27/17 (29-19)  SLT OS 9/09 (28-18) + 9/27/17 (30-21)  2. Trace NSC  3. Fhx DM    Latanoprost QHS OU    Last edited by Aniket Baer, Patient Care Assistant on 10/23/2020  8:23   AM. (History)         Assessment /Plan :  1. Primary open angle glaucoma of both eyes, mild stage IOP not within acceptable range relative to target IOP with risk of irreversible visual loss. Better IOP control is recommended. Discussed options, risks, and benefits of additional medication, SLT laser, and/or incisional glaucoma surgery. Reviewed importance of continued compliance with treatment and follow up.     Patient chooses schedule SLT  OU     2. Nuclear sclerosis, bilateral monitor for now   3. Cortical cataract of both eyes monitor for now     Return for the SLT OU

## 2020-11-04 ENCOUNTER — OUTSIDE PLACE OF SERVICE (OUTPATIENT)
Dept: ADMINISTRATIVE | Facility: OTHER | Age: 70
End: 2020-11-04
Payer: MEDICARE

## 2020-11-04 PROCEDURE — 65855 TRABECULOPLASTY LASER SURG: CPT | Mod: 50,,, | Performed by: OPHTHALMOLOGY

## 2020-11-04 PROCEDURE — 65855 PR TRABECULOPLASTY LASER SURGERY: ICD-10-PCS | Mod: 50,,, | Performed by: OPHTHALMOLOGY

## 2020-12-14 ENCOUNTER — OFFICE VISIT (OUTPATIENT)
Dept: OPHTHALMOLOGY | Facility: CLINIC | Age: 70
End: 2020-12-14
Payer: MEDICARE

## 2020-12-14 DIAGNOSIS — H40.1131 PRIMARY OPEN ANGLE GLAUCOMA OF BOTH EYES, MILD STAGE: Primary | ICD-10-CM

## 2020-12-14 DIAGNOSIS — H25.13 NUCLEAR SCLEROSIS, BILATERAL: ICD-10-CM

## 2020-12-14 PROCEDURE — 99999 PR PBB SHADOW E&M-EST. PATIENT-LVL II: ICD-10-PCS | Mod: PBBFAC,,, | Performed by: OPHTHALMOLOGY

## 2020-12-14 PROCEDURE — 99999 PR PBB SHADOW E&M-EST. PATIENT-LVL II: CPT | Mod: PBBFAC,,, | Performed by: OPHTHALMOLOGY

## 2020-12-14 PROCEDURE — 92012 INTRM OPH EXAM EST PATIENT: CPT | Mod: S$GLB,,, | Performed by: OPHTHALMOLOGY

## 2020-12-14 PROCEDURE — 92012 PR EYE EXAM, EST PATIENT,INTERMED: ICD-10-PCS | Mod: S$GLB,,, | Performed by: OPHTHALMOLOGY

## 2020-12-14 NOTE — PROGRESS NOTES
SUBJECTIVE  Jose Luis Walker is 70 y.o. male  Uncorrected distance visual acuity was 20/25 -1 in the right eye and 20/25 -1 in the left eye.   Chief Complaint   Patient presents with    Glaucoma     S/p 6wk SLT OU          HPI     Glaucoma      Additional comments: S/p 6wk SLT OU              Comments     S/p 6wk SLT OU  No complaints at this time  VA stable to previous visit  Compliant with drops, used Latanoprost last PM      1. Mild COAG pre SLT 27/28 (goal = 21)  ( Steroid responder)  SLT OD 11/09 (27-17) + 9/27/17 (29-19) + 11/4/20 (22.5-)  SLT OS 9/09 (28-18) + 9/27/17 (30-21) + 11/4/20 (29.5-)  2. Trace NSC  3. Fhx DM    Latanoprost QHS OU          Last edited by Bob Tapia on 12/14/2020  7:53 AM. (History)         Assessment /Plan :  1. Primary open angle glaucoma of both eyes, mild stage -good response to SLT OU    Doing well, IOP within acceptable range relative to target IOP and no evidence of progression. Continue current treatment. Reviewed importance of continued compliance with treatment and follow up.     2. Nuclear sclerosis, bilateral Nuclear sclerotic cataract - not visually significant. Observe.       Return to clinic in 4 months  or as needed.  With IOP Check

## 2021-04-16 ENCOUNTER — OFFICE VISIT (OUTPATIENT)
Dept: OPHTHALMOLOGY | Facility: CLINIC | Age: 71
End: 2021-04-16
Payer: MEDICARE

## 2021-04-16 DIAGNOSIS — H26.9 CORTICAL CATARACT OF BOTH EYES: ICD-10-CM

## 2021-04-16 DIAGNOSIS — H40.1131 PRIMARY OPEN ANGLE GLAUCOMA OF BOTH EYES, MILD STAGE: Primary | ICD-10-CM

## 2021-04-16 DIAGNOSIS — H25.13 NUCLEAR SCLEROSIS, BILATERAL: ICD-10-CM

## 2021-04-16 PROCEDURE — 99999 PR PBB SHADOW E&M-EST. PATIENT-LVL II: CPT | Mod: PBBFAC,,, | Performed by: OPHTHALMOLOGY

## 2021-04-16 PROCEDURE — 99214 PR OFFICE/OUTPT VISIT, EST, LEVL IV, 30-39 MIN: ICD-10-PCS | Mod: S$GLB,,, | Performed by: OPHTHALMOLOGY

## 2021-04-16 PROCEDURE — 99999 PR PBB SHADOW E&M-EST. PATIENT-LVL II: ICD-10-PCS | Mod: PBBFAC,,, | Performed by: OPHTHALMOLOGY

## 2021-04-16 PROCEDURE — 99214 OFFICE O/P EST MOD 30 MIN: CPT | Mod: S$GLB,,, | Performed by: OPHTHALMOLOGY

## 2021-04-16 PROCEDURE — 1159F MED LIST DOCD IN RCRD: CPT | Mod: S$GLB,,, | Performed by: OPHTHALMOLOGY

## 2021-04-16 PROCEDURE — 1159F PR MEDICATION LIST DOCUMENTED IN MEDICAL RECORD: ICD-10-PCS | Mod: S$GLB,,, | Performed by: OPHTHALMOLOGY

## 2021-04-16 RX ORDER — CHOLECALCIFEROL (VITAMIN D3) 25 MCG
1000 TABLET ORAL
COMMUNITY

## 2021-04-16 RX ORDER — LATANOPROST 50 UG/ML
1 SOLUTION/ DROPS OPHTHALMIC NIGHTLY
Qty: 7.5 ML | Refills: 4 | Status: SHIPPED | OUTPATIENT
Start: 2021-04-16 | End: 2022-01-18

## 2021-08-16 ENCOUNTER — OFFICE VISIT (OUTPATIENT)
Dept: OPHTHALMOLOGY | Facility: CLINIC | Age: 71
End: 2021-08-16
Payer: MEDICARE

## 2021-08-16 DIAGNOSIS — H40.1131 PRIMARY OPEN ANGLE GLAUCOMA OF BOTH EYES, MILD STAGE: Primary | ICD-10-CM

## 2021-08-16 DIAGNOSIS — H10.13 ALLERGIC CONJUNCTIVITIS OF BOTH EYES: ICD-10-CM

## 2021-08-16 DIAGNOSIS — H25.13 NUCLEAR SCLEROSIS, BILATERAL: ICD-10-CM

## 2021-08-16 DIAGNOSIS — H26.9 CORTICAL CATARACT OF BOTH EYES: ICD-10-CM

## 2021-08-16 PROCEDURE — 1159F MED LIST DOCD IN RCRD: CPT | Mod: CPTII,S$GLB,, | Performed by: OPHTHALMOLOGY

## 2021-08-16 PROCEDURE — 1159F PR MEDICATION LIST DOCUMENTED IN MEDICAL RECORD: ICD-10-PCS | Mod: CPTII,S$GLB,, | Performed by: OPHTHALMOLOGY

## 2021-08-16 PROCEDURE — 99999 PR PBB SHADOW E&M-EST. PATIENT-LVL II: CPT | Mod: PBBFAC,,, | Performed by: OPHTHALMOLOGY

## 2021-08-16 PROCEDURE — 1160F RVW MEDS BY RX/DR IN RCRD: CPT | Mod: CPTII,S$GLB,, | Performed by: OPHTHALMOLOGY

## 2021-08-16 PROCEDURE — 99214 OFFICE O/P EST MOD 30 MIN: CPT | Mod: S$GLB,,, | Performed by: OPHTHALMOLOGY

## 2021-08-16 PROCEDURE — 99999 PR PBB SHADOW E&M-EST. PATIENT-LVL II: ICD-10-PCS | Mod: PBBFAC,,, | Performed by: OPHTHALMOLOGY

## 2021-08-16 PROCEDURE — 92133 POSTERIOR SEGMENT OCT OPTIC NERVE(OCULAR COHERENCE TOMOGRAPHY) - OU - BOTH EYES: ICD-10-PCS | Mod: S$GLB,,, | Performed by: OPHTHALMOLOGY

## 2021-08-16 PROCEDURE — 92133 CPTRZD OPH DX IMG PST SGM ON: CPT | Mod: S$GLB,,, | Performed by: OPHTHALMOLOGY

## 2021-08-16 PROCEDURE — 1160F PR REVIEW ALL MEDS BY PRESCRIBER/CLIN PHARMACIST DOCUMENTED: ICD-10-PCS | Mod: CPTII,S$GLB,, | Performed by: OPHTHALMOLOGY

## 2021-08-16 PROCEDURE — 99214 PR OFFICE/OUTPT VISIT, EST, LEVL IV, 30-39 MIN: ICD-10-PCS | Mod: S$GLB,,, | Performed by: OPHTHALMOLOGY

## 2021-08-16 RX ORDER — TIMOLOL MALEATE 5 MG/ML
1 SOLUTION/ DROPS OPHTHALMIC EVERY MORNING
Qty: 5 ML | Refills: 12 | Status: SHIPPED | OUTPATIENT
Start: 2021-08-16 | End: 2022-01-18

## 2021-09-09 ENCOUNTER — OFFICE VISIT (OUTPATIENT)
Dept: OPHTHALMOLOGY | Facility: CLINIC | Age: 71
End: 2021-09-09
Payer: MEDICARE

## 2021-09-09 DIAGNOSIS — H25.13 NUCLEAR SCLEROSIS, BILATERAL: ICD-10-CM

## 2021-09-09 DIAGNOSIS — H40.1131 PRIMARY OPEN ANGLE GLAUCOMA OF BOTH EYES, MILD STAGE: Primary | ICD-10-CM

## 2021-09-09 PROCEDURE — 99999 PR PBB SHADOW E&M-EST. PATIENT-LVL II: ICD-10-PCS | Mod: PBBFAC,,, | Performed by: OPHTHALMOLOGY

## 2021-09-09 PROCEDURE — 1160F RVW MEDS BY RX/DR IN RCRD: CPT | Mod: CPTII,S$GLB,, | Performed by: OPHTHALMOLOGY

## 2021-09-09 PROCEDURE — 1159F MED LIST DOCD IN RCRD: CPT | Mod: CPTII,S$GLB,, | Performed by: OPHTHALMOLOGY

## 2021-09-09 PROCEDURE — 1159F PR MEDICATION LIST DOCUMENTED IN MEDICAL RECORD: ICD-10-PCS | Mod: CPTII,S$GLB,, | Performed by: OPHTHALMOLOGY

## 2021-09-09 PROCEDURE — 99214 PR OFFICE/OUTPT VISIT, EST, LEVL IV, 30-39 MIN: ICD-10-PCS | Mod: S$GLB,,, | Performed by: OPHTHALMOLOGY

## 2021-09-09 PROCEDURE — 99999 PR PBB SHADOW E&M-EST. PATIENT-LVL II: CPT | Mod: PBBFAC,,, | Performed by: OPHTHALMOLOGY

## 2021-09-09 PROCEDURE — 1160F PR REVIEW ALL MEDS BY PRESCRIBER/CLIN PHARMACIST DOCUMENTED: ICD-10-PCS | Mod: CPTII,S$GLB,, | Performed by: OPHTHALMOLOGY

## 2021-09-09 PROCEDURE — 99214 OFFICE O/P EST MOD 30 MIN: CPT | Mod: S$GLB,,, | Performed by: OPHTHALMOLOGY

## 2021-11-24 NOTE — MR AVS SNAPSHOT
[facility] - Ophthalmology  [address] [facility] [place] [address]  Phone: [phone]  Fax: [phone]                  Jose Luis Walker   2017 7:45 AM   Office Visit    Description:  Male : 1950   Provider:  Joseph Garcia MD   Department:  [facility] - Ophthalmology           Reason for Visit     Glaucoma           Diagnoses this Visit        Comments    Primary open angle glaucoma of both eyes, mild stage    -  Primary     Nuclear sclerosis, bilateral                To Do List           Future Appointments        Provider Department Dept Phone    2017 11:20 AM LABORATORY, [address] [facility] [phone]      Goals (5 Years of Data)     None      Follow-Up and Disposition     Return in about 3 months (around 2017).      [facility]     [facility] Care [facility] - / Assistance  Registered nurses in the [facility] provide clinical advisement, health education, appointment booking, and other advisory services.  Call for this free service at [phone].             Medications           Message regarding Medications     Verify the changes and/or additions to your medication regime listed below are the same as discussed with your clinician today.  If any of these changes or additions are incorrect, please notify your healthcare provider.             Verify that the below list of medications is an accurate representation of the medications you are currently taking.  If none reported, the list may be blank. If incorrect, please contact your healthcare provider. Carry this list with you in case of emergency.           Current Medications     aspirin (ECOTRIN) 81 MG EC tablet Take 1 tablet (81 mg total) by mouth once daily.    GLUC/CHND/OM3/DHA/EPA/FISH/STR (GLUCOSAMINE CHONDROITIN PLUS ORAL) Take by mouth. Patient states that he takes this medication daily    latanoprost 0.005 % ophthalmic solution INSTILL 1 DROP IN BOTH EYES AT BEDTIME     03/23/2021    100% RCA    COLONOSCOPY  2007?  CYSTOSCOPY Bilateral 12/19/2018    CYSTOSCOPY, BIOSPY FULGURATION OF BLADDER TUMOR POSSIBLE TURBT, RETROGRADE PYELOGRAM performed by Marybeth Casas MD at [address] COLONOSCOPY FLX DX W/COLLJ SPEC WHEN PFRMD N/A 09/11/2017    Dr Amador Aldrich internal hemorrhoids, diverticular disease-HP-No recall (age)   [place] REVISE MEDIAN N/CARPAL TUNNEL SURG Left 07/18/2018    OPEN CARPAL TUNNEL RELEASE performed by Horacio Ayala MD at [address] Left 08/28/2018    LEFT CAROTID ENDARTERECTOMY WITH VEIN PATCH ANGIOPLASTY AND COMPLETION ANGIOGRAM performed by Tamir Hicks MD at [address] [facility] Left 10/15/2018    LEFT COMPLEX TOTAL KNEE ARTHROPLASTY performed by Horacio Ayala MD at [address] [facility] ENDOSCOPY N/A 11/18/2021    Dr Poe Blind, Sub prep lg amount of solid and semisolid food/Medication in stomach body/antrum/pylorus, stomach lumen appeared to distended w air insufflation and collapse upon suction,   inadequate exam sugg of gastroparesis, repeat in 10-14 days    VASCULAR SURGERY  04/21/2015    Erick BESS Ultrasound guided access of left common femoral artery. Aortogram.Diagnostic right lower extremity arteriogram.Radiologic supervision and interpretation.  VASCULAR SURGERY  01/13/2015    Erick Dillon M.D Atherectomy,angioplasty,and stenting of left superficial femoral artery.  VASCULAR SURGERY  03/11/2014    Erick Dillon M.D. Ultrasound-guided access of right common femoral artery. Aortogram.Left lower extremity arteriogram.Atherectomy and angioplasty of left superficial femoral artery. Radiologic supervision and interpretation.  VASCULAR SURGERY  01/18/2013    Erick BESS Aortogram.Multistation arteriogram right lower extremity. Laser atherectomy and angioplasty of right superficial femoral artery. Selective metoprolol succinate (TOPROL-XL) 50 MG 24 hr tablet TAKE 1 TABLET (50 MG TOTAL) BY MOUTH ONCE DAILY.    ezetimibe (ZETIA) 10 mg tablet Take 1 tablet (10 mg total) by mouth every evening.    UNKNOWN TO PATIENT Medication for hair and nails           Clinical Reference Information           Allergies as of 1/27/2017     Codeine    Lipitor [Atorvastatin]    Pravastatin    Simvastatin      Immunizations Administered on Date of Encounter - 1/27/2017     None       ferrous sulfate (IRON 325) 325 (65 Fe) MG tablet Take 1 tablet by mouth 2 times daily (with meals) 3/29/21   Georgina Valencia MD   folic acid (FOLVITE) 1 MG tablet Take 1 tablet by mouth daily 3/29/21   Georgina Valencia MD   tamsulosin [facility]) 0.4 MG capsule Take 1 capsule by mouth 2 times daily 3/29/21   Georgina Valencia MD   vitamin D (ERGOCALCIFEROL) 1.25 MG (71203 UT) CAPS capsule Take 1 capsule by mouth once a week 3/29/21   Georgina Valencia MD   OXYGEN Inhale 2 L into the lungs continuous 3/29/21   Georgina Valencia MD     Allergies:    Eliquis [apixaban] and Promethazine hcl    Social History:    The patient currently lives at home with his spouse. Tobacco:   reports that he quit smoking about 18 years ago. He has never used smokeless tobacco.  Alcohol:   reports current alcohol use of about 12.0 standard drinks of alcohol per week. Illicit Drugs: None    Family History:      Problem Relation Age of Onset    Colon Cancer Father     Diabetes Brother     Colon Polyps Neg Hx     Liver Cancer Neg Hx     Liver Disease Neg Hx     Esophageal Cancer Neg Hx     Rectal Cancer Neg Hx     Stomach Cancer Neg Hx      Review of Systems:   Unable to obtain 2/2 intubation/sedation    Physical Examination:  BP (!) 116/55   Pulse 63   Temp 98 °F (36.7 °C) (Temporal)   Resp 18   Ht 6' (1.829 m)   Wt 238 lb 1.6 oz (108 kg)   SpO2 95%   BMI 32.29 kg/m²   General appearance: [de-identified] yo male, ill appearing, intubated/sedated, no acute distress  Head: Normocephalic, without obvious abnormality, atraumatic  Eyes: conjunctivae/corneas clear. PERRL, EOM's intact.    Ears: normal external ears and nose, throat without exudate  Neck: no adenopathy, no carotid bruit, no JVD, supple, symmetrical, trachea midline   Lungs: diminished throughout with scattered wheezing   Heart: RRR, S1, S2 normal, no murmur  Abdomen:soft, non-tender; non-distended, normal bowel sounds no masses, no organomegaly  Extremities:1-2+ BLE edema w/ chronic venous stasis changes  Skin: warm, dry  Lymphatic: No palpable lymph node enlargment  Neurologic: Intubated/sedated, unable to fully assess    Diagnostic Data:  CBC:  Recent Labs     11/23/21  1627   WBC 14.2*   HGB 12.5*   HCT 41.5*        BMP:  Recent Labs     11/23/21  1627 11/23/21  1655 11/24/21  0436     --  138   K 4.3 4.2 4.1     --  98   CO2 26  --  24   BUN 34*  --  34*   CREATININE 3.1*  --  3.0*   CALCIUM 9.2  --  8.9     Recent Labs     11/23/21  1627 11/24/21  0436   AST 15 10   ALT 14 12   BILITOT 0.8 0.8   ALKPHOS 146* 120     Coag Panel:   Recent Labs     11/23/21  1627   INR 1.16   PROTIME 15.0*   APTT 31.0     Cardiac Enzymes:   Recent Labs     11/23/21  1627 11/23/21  2244 11/24/21  0436   TROPONINI 0.02 0.02 0.02     ABGs:  Lab Results   Component Value Date    PHART 7.060 11/23/2021    PO2ART 73.0 11/23/2021    JIP0MMO 107.0 11/23/2021     Urinalysis:  Lab Results   Component Value Date    NITRU Negative 11/23/2021    WBCUA 1 11/23/2021    BACTERIA NEGATIVE 11/23/2021    RBCUA 1 11/23/2021    BLOODU Negative 11/23/2021    SPECGRAV 1.017 11/23/2021    GLUCOSEU Negative 11/23/2021     ABG:  Recent Labs     11/23/21  1655   PHART 7.060*   EBN3FED 107.0*   PO2ART 73.0*   NCD1OEF 30.3*   BEART -2.6*   HGBAE 12.5*   Q4STEKTS 88.1*   CARBOXHGBART 0.9     XR CHEST PORTABLE  Mild cardiomegaly and changes of parahilar interstitial pulmonary edema unchanged in one hour. 2. Endotracheal tube satisfactorily positioned Signed by Dr Erma Johnson  1. Findings most suggestive of pulmonary edema.  Signed by Dr Juan Altman    Palliative Performance Scale:  60-70% at baseline, currently 10%    Palliative Review of Advance Directives:     Surrogate Decision Maker:Yes-Barbara CagleTinsrboz-[phone]    Durable Power of :no    Advanced Directives/Living Jung: yes    Out of hospital medical orders in place to reflect resuscitation status (MOLST/POLST): no    Information Sharing:  Patient's awareness of illness:  [] Terminal [] Life-Threatening [] Serious [] Non life-threatening [] Not serious   [x] Not discussed    Family awareness of illness:   [] Terminal [x] Life-Threatening [] Serious [] Nonlife-threatening [] Not serious   [] Not discussed    Assessment/Plan:  Principal Problem:    Acute decompensated heart failure ([facility]  Active Problems:    Essential hypertension    Palliative care patient    Bl[facility] ([facility]    Type 2 diabetes mellitus with complication, without long-term current use of insulin ([facility]    Pacemaker    Chronic diastolic congestive heart failure ([facility]    Acute kidney injury superimposed on CKD ([facility]    COPD with exacerbation ([facility]  Resolved Problems:    * No resolved hospital problems. *     Visit Summary:  Chart reviewed, patient discussed with consulting service and nursing staff. Reviewed health issues, work up and treatment plan as well as factors that lead to hospitalization. Mr. Los Macias is followed by outpatient supportive care. He was last seen 04/2021 and at that time had goals to live longer, improvement/maintain function/quality of life and remain at home. He is unable to participate in conversation due to sedation/intubation at this time. I called and spoke with his daughter, Cristian Granados is his primary decision maker listed on his living will. [facility] does not live locally but does participate in decision making for her father. She is in contact with her step sister who does live locally. Mr. Mark Curtis spouse has dementia and is unable to make decisions for him. I introduced myself to [facility] as well as role of Palliative care. We discussed current clinical concerns in setting of chronic diseases. [facility] states she is willing to make decisions and will take things a day at a time with a goal of successful extubation and returning home if able. Support/comfort offered. Recommendations:     1.  Palliative

## 2022-01-13 ENCOUNTER — OFFICE VISIT (OUTPATIENT)
Dept: OPHTHALMOLOGY | Facility: CLINIC | Age: 72
End: 2022-01-13
Payer: MEDICARE

## 2022-01-13 DIAGNOSIS — H25.13 NUCLEAR SCLEROSIS, BILATERAL: ICD-10-CM

## 2022-01-13 DIAGNOSIS — H26.9 CORTICAL CATARACT OF BOTH EYES: ICD-10-CM

## 2022-01-13 DIAGNOSIS — H40.1131 PRIMARY OPEN ANGLE GLAUCOMA OF BOTH EYES, MILD STAGE: Primary | ICD-10-CM

## 2022-01-13 PROCEDURE — 1160F PR REVIEW ALL MEDS BY PRESCRIBER/CLIN PHARMACIST DOCUMENTED: ICD-10-PCS | Mod: CPTII,S$GLB,, | Performed by: OPHTHALMOLOGY

## 2022-01-13 PROCEDURE — 99999 PR PBB SHADOW E&M-EST. PATIENT-LVL II: CPT | Mod: PBBFAC,,, | Performed by: OPHTHALMOLOGY

## 2022-01-13 PROCEDURE — 1160F RVW MEDS BY RX/DR IN RCRD: CPT | Mod: CPTII,S$GLB,, | Performed by: OPHTHALMOLOGY

## 2022-01-13 PROCEDURE — 99214 PR OFFICE/OUTPT VISIT, EST, LEVL IV, 30-39 MIN: ICD-10-PCS | Mod: S$GLB,,, | Performed by: OPHTHALMOLOGY

## 2022-01-13 PROCEDURE — 92083 HUMPHREY VISUAL FIELD - OU - BOTH EYES: ICD-10-PCS | Mod: S$GLB,,, | Performed by: OPHTHALMOLOGY

## 2022-01-13 PROCEDURE — 1159F PR MEDICATION LIST DOCUMENTED IN MEDICAL RECORD: ICD-10-PCS | Mod: CPTII,S$GLB,, | Performed by: OPHTHALMOLOGY

## 2022-01-13 PROCEDURE — 92133 CPTRZD OPH DX IMG PST SGM ON: CPT | Mod: S$GLB,,, | Performed by: OPHTHALMOLOGY

## 2022-01-13 PROCEDURE — 92133 POSTERIOR SEGMENT OCT OPTIC NERVE(OCULAR COHERENCE TOMOGRAPHY) - OU - BOTH EYES: ICD-10-PCS | Mod: S$GLB,,, | Performed by: OPHTHALMOLOGY

## 2022-01-13 PROCEDURE — 99214 OFFICE O/P EST MOD 30 MIN: CPT | Mod: S$GLB,,, | Performed by: OPHTHALMOLOGY

## 2022-01-13 PROCEDURE — 1159F MED LIST DOCD IN RCRD: CPT | Mod: CPTII,S$GLB,, | Performed by: OPHTHALMOLOGY

## 2022-01-13 PROCEDURE — 99999 PR PBB SHADOW E&M-EST. PATIENT-LVL II: ICD-10-PCS | Mod: PBBFAC,,, | Performed by: OPHTHALMOLOGY

## 2022-01-13 PROCEDURE — 92083 EXTENDED VISUAL FIELD XM: CPT | Mod: S$GLB,,, | Performed by: OPHTHALMOLOGY

## 2022-01-13 RX ORDER — BRIMONIDINE TARTRATE, TIMOLOL MALEATE 2; 5 MG/ML; MG/ML
1 SOLUTION/ DROPS OPHTHALMIC EVERY 12 HOURS
Qty: 10 ML | Refills: 12 | Status: SHIPPED | OUTPATIENT
Start: 2022-01-13 | End: 2022-01-18 | Stop reason: SDUPTHER

## 2022-01-13 RX ORDER — NETARSUDIL AND LATANOPROST OPHTHALMIC SOLUTION, 0.02%/0.005% .2; .05 MG/ML; MG/ML
1 SOLUTION/ DROPS OPHTHALMIC; TOPICAL NIGHTLY
Qty: 2.5 ML | Refills: 12 | Status: SHIPPED | OUTPATIENT
Start: 2022-01-13 | End: 2022-01-18 | Stop reason: SDUPTHER

## 2022-01-13 NOTE — PROGRESS NOTES
SUBJECTIVE  Jose Luis Walker is 71 y.o. male  Uncorrected distance visual acuity was 20/30 in the right eye and 20/25 in the left eye.   Chief Complaint   Patient presents with    Glaucoma     GOCT/ HVF/ Dil          HPI     Glaucoma      Additional comments: GOCT/ HVF/ Dil              Comments     Patient noticing gradual decrease in dva, especially with night driving   due to oncoming glare from headlights - denies pain/ discomfort OU - using   and tolerating Latanoprost/ Timolol OU as directed -       Bilateral ulnar nerve and carpal tunnel sx     1. Mild COAG pre SLT 27/28 (goal = 21)   ( Steroid responder)   SLT OD 11/09 (27-17) + 9/27/17 (29-19) + 11/4/20 (22.5-21)   SLT OS 9/09 (28-18) + 9/27/17 (30-21) + 11/4/20 (29.5-21.5)   2. Trace NSC   3. Fhx DM     Latanoprost QHS OU  Timolol QAM OU            Last edited by Juliana Oliveira MA on 1/13/2022  9:21 AM. (History)         Assessment /Plan :  1. Primary open angle glaucoma of both eyes, mild stage  -uncontrolled today - severe increase in IOP - no uveitis and 4+ open angles- pt will check onointment he is using at home to rule out steroid   2. Nuclear sclerosis, bilateral    3. Cortical cataract of both eyes        STOP TIMOLOL AND LATANOPROST   TRIAL ROCKALTAN QHS OU AND COMBIGAN BID OU     RTC 7-10 DAYS

## 2022-01-14 ENCOUNTER — TELEPHONE (OUTPATIENT)
Dept: OPHTHALMOLOGY | Facility: CLINIC | Age: 72
End: 2022-01-14
Payer: MEDICARE

## 2022-01-14 NOTE — TELEPHONE ENCOUNTER
Called patient to notify them of appointment .Left message that appt would be on pt portal, [facility] wanted to see him in 7/10 days but since IOP was in higher 40's and [facility] is so booked- I made appt for 6 days to rtc     Pt Is active today on pt portal   
none

## 2022-01-18 ENCOUNTER — OFFICE VISIT (OUTPATIENT)
Dept: OPHTHALMOLOGY | Facility: CLINIC | Age: 72
End: 2022-01-18
Payer: MEDICARE

## 2022-01-18 DIAGNOSIS — H25.13 NUCLEAR SCLEROSIS, BILATERAL: ICD-10-CM

## 2022-01-18 DIAGNOSIS — H26.9 CORTICAL CATARACT OF BOTH EYES: ICD-10-CM

## 2022-01-18 DIAGNOSIS — H40.1131 PRIMARY OPEN ANGLE GLAUCOMA OF BOTH EYES, MILD STAGE: Primary | ICD-10-CM

## 2022-01-18 PROCEDURE — 99999 PR PBB SHADOW E&M-EST. PATIENT-LVL II: ICD-10-PCS | Mod: PBBFAC,,, | Performed by: OPHTHALMOLOGY

## 2022-01-18 PROCEDURE — 1159F MED LIST DOCD IN RCRD: CPT | Mod: CPTII,S$GLB,, | Performed by: OPHTHALMOLOGY

## 2022-01-18 PROCEDURE — 99999 PR PBB SHADOW E&M-EST. PATIENT-LVL II: CPT | Mod: PBBFAC,,, | Performed by: OPHTHALMOLOGY

## 2022-01-18 PROCEDURE — 99214 PR OFFICE/OUTPT VISIT, EST, LEVL IV, 30-39 MIN: ICD-10-PCS | Mod: S$GLB,,, | Performed by: OPHTHALMOLOGY

## 2022-01-18 PROCEDURE — 1159F PR MEDICATION LIST DOCUMENTED IN MEDICAL RECORD: ICD-10-PCS | Mod: CPTII,S$GLB,, | Performed by: OPHTHALMOLOGY

## 2022-01-18 PROCEDURE — 1160F PR REVIEW ALL MEDS BY PRESCRIBER/CLIN PHARMACIST DOCUMENTED: ICD-10-PCS | Mod: CPTII,S$GLB,, | Performed by: OPHTHALMOLOGY

## 2022-01-18 PROCEDURE — 99214 OFFICE O/P EST MOD 30 MIN: CPT | Mod: S$GLB,,, | Performed by: OPHTHALMOLOGY

## 2022-01-18 PROCEDURE — 1160F RVW MEDS BY RX/DR IN RCRD: CPT | Mod: CPTII,S$GLB,, | Performed by: OPHTHALMOLOGY

## 2022-01-18 RX ORDER — NETARSUDIL AND LATANOPROST OPHTHALMIC SOLUTION, 0.02%/0.005% .2; .05 MG/ML; MG/ML
1 SOLUTION/ DROPS OPHTHALMIC; TOPICAL NIGHTLY
Qty: 2.5 ML | Refills: 12 | Status: SHIPPED | OUTPATIENT
Start: 2022-01-18

## 2022-01-18 RX ORDER — BRIMONIDINE TARTRATE, TIMOLOL MALEATE 2; 5 MG/ML; MG/ML
1 SOLUTION/ DROPS OPHTHALMIC EVERY 12 HOURS
Qty: 10 ML | Refills: 12 | Status: SHIPPED | OUTPATIENT
Start: 2022-01-18 | End: 2022-04-18 | Stop reason: SDUPTHER

## 2022-01-18 NOTE — PROGRESS NOTES
SUBJECTIVE  Ray GILMAR Walker is 71 y.o. male  Uncorrected distance visual acuity was 20/30 in the right eye and 20/25 -2 in the left eye.   Chief Complaint   Patient presents with    Glaucoma     Pt here for 1 wk Rocklatan and Combigan trial chk. No pain or discomfort. Pt says his eyes have been watering more. VA stable. 100% compliant with gtts.           HPI     Glaucoma      Additional comments: Pt here for 1 wk Rocklatan and Combigan trial chk.   No pain or discomfort. Pt says his eyes have been watering more. VA   stable. 100% compliant with gtts.               Comments     1. Mild COAG pre SLT 27/28 (goal = 21)   ( Steroid responder)   SLT OD 11/09 (27-17) + 9/27/17 (29-19) + 11/4/20 (22.5-21)   SLT OS 9/09 (28-18) + 9/27/17 (30-21) + 11/4/20 (29.5-21.5)   2. Trace NSC   3. Fhx DM     Latanoprost QHS OU (Stopped 1/13/2022)  Timolol QAM OU (Stopped 1/13/2022)  Combigan BID OU  Rocklatan QHS OU            Last edited by Aniket Baer MA on 1/18/2022  9:57 AM. (History)         Assessment /Plan :  1. Primary open angle glaucoma of both eyes, mild stage IOP's are better today, but still above the goal    Patient instructed to continue using the following glaucoma medication as follows:  Rocklatan one drop in each eye nightly and Combigan one drop in each eye every 12 hours    Return to clinic in 6-7 weeks  or as needed.  With IOP Check     2. Nuclear sclerosis, bilateral - monitor for now   3. Cortical cataract of both eyes - monitor for now

## 2022-01-26 ENCOUNTER — TELEPHONE (OUTPATIENT)
Dept: OPHTHALMOLOGY | Facility: CLINIC | Age: 72
End: 2022-01-26
Payer: MEDICARE

## 2022-01-26 NOTE — TELEPHONE ENCOUNTER
Pt called to say that he has been experiencing some blurry vision recently. Asked pt if he has used any AT's recently, and he replied with no. Recommended him use some AT's for a week and to give us a call to let us know if it has helped or not.

## 2022-01-26 NOTE — TELEPHONE ENCOUNTER
----- Message from Amy Lee sent at 1/26/2022 10:26 AM [facility] -----  [phone] would like to be advised about eye drops causing blurry vision

## 2022-03-01 ENCOUNTER — TELEPHONE (OUTPATIENT)
Dept: OPHTHALMOLOGY | Facility: CLINIC | Age: 72
End: 2022-03-01
Payer: MEDICARE

## 2022-03-01 ENCOUNTER — OFFICE VISIT (OUTPATIENT)
Dept: OPHTHALMOLOGY | Facility: CLINIC | Age: 72
End: 2022-03-01
Payer: MEDICARE

## 2022-03-01 DIAGNOSIS — H40.1131 PRIMARY OPEN ANGLE GLAUCOMA OF BOTH EYES, MILD STAGE: Primary | ICD-10-CM

## 2022-03-01 DIAGNOSIS — H25.13 NUCLEAR SCLEROSIS, BILATERAL: ICD-10-CM

## 2022-03-01 DIAGNOSIS — H26.9 CORTICAL CATARACT OF BOTH EYES: ICD-10-CM

## 2022-03-01 PROCEDURE — 1160F PR REVIEW ALL MEDS BY PRESCRIBER/CLIN PHARMACIST DOCUMENTED: ICD-10-PCS | Mod: CPTII,S$GLB,, | Performed by: OPHTHALMOLOGY

## 2022-03-01 PROCEDURE — 1160F RVW MEDS BY RX/DR IN RCRD: CPT | Mod: CPTII,S$GLB,, | Performed by: OPHTHALMOLOGY

## 2022-03-01 PROCEDURE — 1159F MED LIST DOCD IN RCRD: CPT | Mod: CPTII,S$GLB,, | Performed by: OPHTHALMOLOGY

## 2022-03-01 PROCEDURE — 99999 PR PBB SHADOW E&M-EST. PATIENT-LVL III: CPT | Mod: PBBFAC,,, | Performed by: OPHTHALMOLOGY

## 2022-03-01 PROCEDURE — 99999 PR PBB SHADOW E&M-EST. PATIENT-LVL III: ICD-10-PCS | Mod: PBBFAC,,, | Performed by: OPHTHALMOLOGY

## 2022-03-01 PROCEDURE — 92020 PR SPECIAL EYE EVAL,GONIOSCOPY: ICD-10-PCS | Mod: S$GLB,,, | Performed by: OPHTHALMOLOGY

## 2022-03-01 PROCEDURE — 92020 GONIOSCOPY: CPT | Mod: S$GLB,,, | Performed by: OPHTHALMOLOGY

## 2022-03-01 PROCEDURE — 1159F PR MEDICATION LIST DOCUMENTED IN MEDICAL RECORD: ICD-10-PCS | Mod: CPTII,S$GLB,, | Performed by: OPHTHALMOLOGY

## 2022-03-01 PROCEDURE — 99214 OFFICE O/P EST MOD 30 MIN: CPT | Mod: S$GLB,,, | Performed by: OPHTHALMOLOGY

## 2022-03-01 PROCEDURE — 99214 PR OFFICE/OUTPT VISIT, EST, LEVL IV, 30-39 MIN: ICD-10-PCS | Mod: S$GLB,,, | Performed by: OPHTHALMOLOGY

## 2022-03-01 RX ORDER — SODIUM PICOSULFATE, MAGNESIUM OXIDE, AND ANHYDROUS CITRIC ACID 10; 3.5; 12 MG/160ML; G/160ML; G/160ML
LIQUID ORAL
COMMUNITY
Start: 2021-11-01

## 2022-03-01 NOTE — TELEPHONE ENCOUNTER
The patient is scheduled to see Dr. Jose on 4/11/22 at 1:30 pm. The patient's information has been faxed to Dr. Jose's office. I left a message on the patient's voice mailbox about his appointment with Dr. Jose.

## 2022-03-01 NOTE — PROGRESS NOTES
SUBJECTIVE  Jose Luis Walker is 71 y.o. male  Uncorrected distance visual acuity was 20/30 -1 in the right eye and 20/30 +1 in the left eye.   Chief Complaint   Patient presents with    Glaucoma     6-7 week IOP check. Eyes appear red and irritated, notices blurriness in vision due to gtts, 100% compliant with drops gtts. Denies and pain or discomfort.           HPI     Glaucoma      Additional comments: 6-7 week IOP check. Eyes appear red and irritated,   notices blurriness in vision due to gtts, 100% compliant with drops gtts.   Denies and pain or discomfort.               Comments     1. Mild COAG pre SLT 27/28 (goal = 21)   ( Steroid responder)   SLT OD 11/09 (27-17) + 9/27/17 (29-19) + 11/4/20 (22.5-21)   SLT OS 9/09 (28-18) + 9/27/17 (30-21) + 11/4/20 (29.5-21.5)   2. Trace NSC   3. Fhx DM     Latanoprost QHS OU (Stopped 1/13/2022)  Timolol QAM OU (Stopped 1/13/2022)    Combigan BID OU  Rocklatan QHS OU            Last edited by Real Johnson on 3/1/2022 10:39 AM. (History)         Assessment /Plan :  1. Primary open angle glaucoma of both eyes, mild stage IOP not within acceptable range relative to target IOP with risk of irreversible visual loss. Better IOP control is recommended. Discussed options, risks, and benefits of additional medication, SLT laser, and/or incisional glaucoma surgery. Reviewed importance of continued compliance with treatment and follow up.     Patient chooses to Consult Dr. Jose      Patient instructed to continue using the following glaucoma medication as follows:  Rocklatan one drop in each eye nightly and Combigan one drop in each eye every 12 hours       2. Nuclear sclerosis, bilateral - monitor for now   3. Cortical cataract of both eyes - monitor for now

## 2022-03-03 ENCOUNTER — TELEPHONE (OUTPATIENT)
Dept: OPHTHALMOLOGY | Facility: CLINIC | Age: 72
End: 2022-03-03
Payer: MEDICARE

## 2022-04-18 DIAGNOSIS — H40.1131 PRIMARY OPEN ANGLE GLAUCOMA OF BOTH EYES, MILD STAGE: ICD-10-CM

## 2022-04-18 RX ORDER — BRIMONIDINE TARTRATE, TIMOLOL MALEATE 2; 5 MG/ML; MG/ML
1 SOLUTION/ DROPS OPHTHALMIC EVERY 12 HOURS
Qty: 10 ML | Refills: 12 | Status: SHIPPED | OUTPATIENT
Start: 2022-04-18 | End: 2022-05-18

## 2022-04-19 DIAGNOSIS — H40.1131 PRIMARY OPEN ANGLE GLAUCOMA OF BOTH EYES, MILD STAGE: ICD-10-CM

## 2022-04-19 RX ORDER — BRIMONIDINE TARTRATE, TIMOLOL MALEATE 2; 5 MG/ML; MG/ML
1 SOLUTION/ DROPS OPHTHALMIC EVERY 12 HOURS
Qty: 10 ML | Refills: 12 | Status: CANCELLED | OUTPATIENT
Start: 2022-04-19 | End: 2022-05-19

## 2022-05-23 ENCOUNTER — TELEPHONE (OUTPATIENT)
Dept: CARDIOLOGY | Facility: HOSPITAL | Age: 72
End: 2022-05-23
Payer: MEDICARE

## 2022-05-23 NOTE — TELEPHONE ENCOUNTER
Patient followed by outside cardiologist, Dr. Srinivasan. He is to request his statin prescription through him or PCP.    Thanks

## 2022-05-23 NOTE — TELEPHONE ENCOUNTER
Patient contacted and was advised that he is followed by outside cardiologist, Dr. Srinivasan. He is to request his statin prescription through him or PCP. The patient stated understanding with no questions or concerns.

## 2024-05-07 NOTE — PROGRESS NOTES
Subjective:   Patient ID:  Jose Luis Walker is a 67 y.o. male who presents for follow up of Coronary Artery Disease      HPI  A 66 yo male with hlp abnormal stress non obs cad is here frof /u. He has been compliant with meds but not diet he walks regularily has no angina . No edema tia or claudication.last lipids are in February.he is toelarting low dose statins with zetia.   Past Medical History:   Diagnosis Date    CAD (coronary artery disease) 6/26/2014    nonobs cad via cath    Ex-smoker     FH: CAD (coronary artery disease) 12/5/2013    Glaucoma     Hyperlipidemia 12/5/2013    Hypertension 12/5/2013    Lumbar disc disorder with myelopathy     Sleep apnea        Past Surgical History:   Procedure Laterality Date    CARDIAC CATHETERIZATION      TONSILLECTOMY, ADENOIDECTOMY         Social History   Substance Use Topics    Smoking status: Former Smoker     Packs/day: 0.50     Years: 10.00     Quit date: 11/19/1983    Smokeless tobacco: Never Used      Comment: quit 30 yrs ago    Alcohol use Yes      Comment: Seldom       Family History   Problem Relation Age of Onset    Diabetes Father     Heart disease Father 58     MI     Heart attack Father     Hypertension Mother     Hyperlipidemia Mother     Heart disease Brother     Cancer Paternal Grandfather      Pancreatic Cancer    Glaucoma Paternal Uncle     Glaucoma Maternal Grandfather     Cataracts Paternal Grandmother     Strabismus Neg Hx     Retinal detachment Neg Hx     Macular degeneration Neg Hx     Blindness Neg Hx     Amblyopia Neg Hx     Thyroid disease Neg Hx     Stroke Neg Hx        Current Outpatient Prescriptions   Medication Sig    aspirin (ECOTRIN) 81 MG EC tablet Take 1 tablet (81 mg total) by mouth once daily.    ezetimibe (ZETIA) 10 mg tablet Take 1 tablet (10 mg total) by mouth every evening.    GLUC/CHND/OM3/DHA/EPA/FISH/STR (GLUCOSAMINE CHONDROITIN PLUS ORAL) Take by mouth. Patient states that he takes this medication  daily    latanoprost 0.005 % ophthalmic solution INSTILL 1 DROP INTO BOTH EYES AT BEDTIME    metoprolol succinate (TOPROL-XL) 50 MG 24 hr tablet TAKE 1 TABLET (50 MG TOTAL) BY MOUTH ONCE DAILY.    multivitamin capsule Take 1 capsule by mouth once daily.    rosuvastatin (CRESTOR) 10 MG tablet Take 1 tablet (10 mg total) by mouth every evening.    UNKNOWN TO PATIENT Medication for hair and nails    ketorolac 0.5% (ACULAR) 0.5 % Drop Put one eyedrop in both eye four times a day. Start first drop morning of laser and stop after five days.     No current facility-administered medications for this visit.      Current Outpatient Prescriptions on File Prior to Visit   Medication Sig    aspirin (ECOTRIN) 81 MG EC tablet Take 1 tablet (81 mg total) by mouth once daily.    ezetimibe (ZETIA) 10 mg tablet Take 1 tablet (10 mg total) by mouth every evening.    GLUC/CHND/OM3/DHA/EPA/FISH/STR (GLUCOSAMINE CHONDROITIN PLUS ORAL) Take by mouth. Patient states that he takes this medication daily    latanoprost 0.005 % ophthalmic solution INSTILL 1 DROP INTO BOTH EYES AT BEDTIME    metoprolol succinate (TOPROL-XL) 50 MG 24 hr tablet TAKE 1 TABLET (50 MG TOTAL) BY MOUTH ONCE DAILY.    multivitamin capsule Take 1 capsule by mouth once daily.    rosuvastatin (CRESTOR) 10 MG tablet Take 1 tablet (10 mg total) by mouth every evening.    UNKNOWN TO PATIENT Medication for hair and nails    ketorolac 0.5% (ACULAR) 0.5 % Drop Put one eyedrop in both eye four times a day. Start first drop morning of laser and stop after five days.     No current facility-administered medications on file prior to visit.      Review of patient's allergies indicates:   Allergen Reactions    Codeine Other (See Comments)     Malaise and fatigue    Lipitor [atorvastatin]      aches    Pravastatin      Back aches      Simvastatin      Muscle aches       Review of Systems   Constitution: Negative for weakness and malaise/fatigue.   HENT: Positive for  congestion.    Eyes: Negative for blurred vision.   Cardiovascular: Negative for chest pain, claudication, cyanosis, dyspnea on exertion, irregular heartbeat, leg swelling, near-syncope, orthopnea, palpitations and paroxysmal nocturnal dyspnea.   Respiratory: Negative for cough, hemoptysis and shortness of breath.    Hematologic/Lymphatic: Negative for bleeding problem. Does not bruise/bleed easily.   Skin: Negative for dry skin and itching.   Musculoskeletal: Negative for falls, muscle weakness and myalgias.   Gastrointestinal: Negative for abdominal pain, diarrhea, heartburn, hematemesis, hematochezia and melena.   Genitourinary: Negative for flank pain and hematuria.   Neurological: Negative for dizziness, focal weakness, headaches, light-headedness, numbness, paresthesias and seizures.   Psychiatric/Behavioral: Negative for altered mental status and memory loss. The patient is not nervous/anxious.    Allergic/Immunologic: Negative for hives.       Objective:   Physical Exam   Constitutional: He is oriented to person, place, and time. He appears well-developed and well-nourished. No distress.   HENT:   Head: Normocephalic and atraumatic.   Eyes: EOM are normal. Pupils are equal, round, and reactive to light. Right eye exhibits no discharge. Left eye exhibits no discharge.   Neck: Neck supple. No JVD present. No thyromegaly present.   Cardiovascular: Normal rate, regular rhythm, normal heart sounds and intact distal pulses.  Exam reveals no gallop and no friction rub.    No murmur heard.  Pulmonary/Chest: Effort normal and breath sounds normal. No respiratory distress. He has no wheezes. He has no rales. He exhibits no tenderness.   Abdominal: Soft. Bowel sounds are normal. He exhibits no distension. There is no tenderness.   Musculoskeletal: Normal range of motion. He exhibits no edema.   Neurological: He is alert and oriented to person, place, and time. No cranial nerve deficit.   Skin: Skin is warm and dry. No  "rash noted. He is not diaphoretic. No erythema.   Psychiatric: He has a normal mood and affect. His behavior is normal.   Nursing note and vitals reviewed.    Vitals:    10/04/17 1352   BP: 130/66   Pulse: 64   Weight: 88.8 kg (195 lb 12.3 oz)   Height: 5' 10" (1.778 m)     Lab Results   Component Value Date    CHOL 196 02/01/2017    CHOL 216 (H) 07/14/2016    CHOL 201 (H) 01/05/2016     Lab Results   Component Value Date    HDL 56 02/01/2017    HDL 56 07/14/2016    HDL 49 01/05/2016     Lab Results   Component Value Date    LDLCALC 129.2 02/01/2017    LDLCALC 149.4 07/14/2016    LDLCALC 134.4 01/05/2016     Lab Results   Component Value Date    TRIG 54 02/01/2017    TRIG 53 07/14/2016    TRIG 88 01/05/2016     Lab Results   Component Value Date    CHOLHDL 28.6 02/01/2017    CHOLHDL 25.9 07/14/2016    CHOLHDL 24.4 01/05/2016       Chemistry        Component Value Date/Time     02/01/2017 0737    K 4.3 02/01/2017 0737     02/01/2017 0737    CO2 27 02/01/2017 0737    BUN 14 02/01/2017 0737    CREATININE 0.9 02/01/2017 0737     02/01/2017 0737        Component Value Date/Time    CALCIUM 9.0 02/01/2017 0737    ALKPHOS 48 (L) 02/01/2017 0737    AST 18 02/01/2017 0737    ALT 16 02/01/2017 0737    BILITOT 0.3 02/01/2017 0737    ESTGFRAFRICA >60.0 02/01/2017 0737    EGFRNONAA >60.0 02/01/2017 0737          Lab Results   Component Value Date    TSH 1.085 11/19/2013     No results found for: INR, PROTIME  Lab Results   Component Value Date    WBC 6.09 03/16/2015    HGB 14.1 03/16/2015    HCT 41.5 03/16/2015    MCV 94 03/16/2015     03/16/2015     BMP  Sodium   Date Value Ref Range Status   02/01/2017 143 136 - 145 mmol/L Final     Potassium   Date Value Ref Range Status   02/01/2017 4.3 3.5 - 5.1 mmol/L Final     Chloride   Date Value Ref Range Status   02/01/2017 109 95 - 110 mmol/L Final     CO2   Date Value Ref Range Status   02/01/2017 27 23 - 29 mmol/L Final     BUN, Bld   Date Value Ref Range " 10.9   11.65 )-----------( 199      ( 07 May 2024 11:40 )             33.0         134<L>  |  102  |  8   ----------------------------<  159<H>  4.1   |  16<L>  |  0.44<L>    Ca    8.7      07 May 2024 11:40    TPro  7.6  /  Alb  3.6  /  TBili  0.4  /  DBili  x   /  AST  38<H>  /  ALT  17  /  AlkPhos  177<H>        Urinalysis Basic - ( 07 May 2024 11:40 )    Color: Yellow / Appearance: Cloudy / S.026 / pH: x  Gluc: 159 mg/dL / Ketone: 15 mg/dL  / Bili: Negative / Urobili: 1.0 mg/dL   Blood: x / Protein: 100 mg/dL / Nitrite: Negative   Leuk Esterase: Negative / RBC: 1 /HPF / WBC 4 /HPF   Sq Epi: x / Non Sq Epi: 8 /HPF / Bacteria: Many /HPF    PC ratio 0.8 Status   02/01/2017 14 8 - 23 mg/dL Final     Creatinine   Date Value Ref Range Status   02/01/2017 0.9 0.5 - 1.4 mg/dL Final     Calcium   Date Value Ref Range Status   02/01/2017 9.0 8.7 - 10.5 mg/dL Final     Anion Gap   Date Value Ref Range Status   02/01/2017 7 (L) 8 - 16 mmol/L Final     eGFR if    Date Value Ref Range Status   02/01/2017 >60.0 >60 mL/min/1.73 m^2 Final     eGFR if non    Date Value Ref Range Status   02/01/2017 >60.0 >60 mL/min/1.73 m^2 Final     Comment:     Calculation used to obtain the estimated glomerular filtration  rate (eGFR) is the CKD-EPI equation. Since race is unknown   in our information system, the eGFR values for   -American and Non--American patients are given   for each creatinine result.       CrCl cannot be calculated (Patient's most recent lab result is older than the maximum 7 days allowed.).    Assessment:     1. Coronary artery disease involving native coronary artery of native heart without angina pectoris    2. Sleep apnea, unspecified type    3. Abnormal stress test    4. FH: CAD (coronary artery disease)    5. Hyperlipidemia, unspecified hyperlipidemia type    6. Essential hypertension    7. Prediabetes      Asymptomatic needs to be compliant with diet.counseled.  Plan:   Continue current therapy  Cardiac low salt diet.  Risk factor modification and excercise program.  F/u in 6 months with lipid cmp   Needs lipid cmp this week on friday

## 2025-04-23 NOTE — PROGRESS NOTES
SUBJECTIVE:   Jose Luis Walker is a 67 y.o. male   Uncorrected distance visual acuity was 20/30 in the right eye and 20/30 -1 in the left eye.   Chief Complaint   Patient presents with    Glaucoma     here for 4 month iop check pt states allergies are bothering ou         HPI:  HPI     Glaucoma    Additional comments: here for 4 month iop check pt states allergies are   bothering ou            Comments   1. Mild COAG pre SLT 27/28 (goal = 21)  ( Steroid responder)  SLT OD 11/09 (27-17) + 9/27/17 (29-19)  SLT OS 9/09 (28-18) + 9/27/17 (30-21)  2. Trace NSC  3. Fhx DM    Latanoprost QHS OU       Last edited by Melony Chavarria MA on 3/2/2018  7:51 AM. (History)        Assessment /Plan :  1. Primary open angle glaucoma of both eyes, mild stage Doing well, IOP within acceptable range relative to target IOP and no evidence of progression. Continue current treatment. Reviewed importance of continued compliance with treatment and follow up.     2. Nuclear sclerosis of both eyes  - not visually significant. Observe.       Return to clinic in 4 months  or as needed.  With 24-2 HVF, Dilation and SDP's              
No